# Patient Record
Sex: FEMALE | Race: WHITE | Employment: UNEMPLOYED | URBAN - METROPOLITAN AREA
[De-identification: names, ages, dates, MRNs, and addresses within clinical notes are randomized per-mention and may not be internally consistent; named-entity substitution may affect disease eponyms.]

---

## 2018-04-24 ENCOUNTER — HOSPITAL ENCOUNTER (EMERGENCY)
Age: 48
Discharge: HOME | End: 2018-04-24
Attending: STUDENT IN AN ORGANIZED HEALTH CARE EDUCATION/TRAINING PROGRAM

## 2018-04-24 VITALS
DIASTOLIC BLOOD PRESSURE: 71 MMHG | OXYGEN SATURATION: 98 % | RESPIRATION RATE: 18 BRPM | TEMPERATURE: 98 F | BODY MASS INDEX: 40.92 KG/M2 | WEIGHT: 270 LBS | HEIGHT: 68 IN | HEART RATE: 72 BPM | SYSTOLIC BLOOD PRESSURE: 124 MMHG

## 2018-04-24 DIAGNOSIS — N93.8 DYSFUNCTIONAL UTERINE BLEEDING: Primary | ICD-10-CM

## 2018-04-24 DIAGNOSIS — N93.8 DUB (DYSFUNCTIONAL UTERINE BLEEDING): ICD-10-CM

## 2018-04-24 LAB
ALBUMIN SERPL-MCNC: 3.7 G/DL (ref 3.9–4.9)
ALP BLD-CCNC: 73 U/L (ref 40–130)
ALT SERPL-CCNC: 11 U/L (ref 0–33)
ANION GAP SERPL CALCULATED.3IONS-SCNC: 12 MEQ/L (ref 7–13)
APTT: 24 SEC (ref 21.6–35.4)
AST SERPL-CCNC: 12 U/L (ref 0–35)
BACTERIA: ABNORMAL /HPF
BASOPHILS ABSOLUTE: 0.1 K/UL (ref 0–0.2)
BASOPHILS RELATIVE PERCENT: 1.2 %
BILIRUB SERPL-MCNC: 0.4 MG/DL (ref 0–1.2)
BILIRUBIN URINE: NEGATIVE
BLOOD, URINE: ABNORMAL
BUN BLDV-MCNC: 6 MG/DL (ref 6–20)
CALCIUM SERPL-MCNC: 8.2 MG/DL (ref 8.6–10.2)
CHLORIDE BLD-SCNC: 104 MEQ/L (ref 98–107)
CHP ED QC CHECK: NORMAL
CLARITY: CLEAR
CO2: 24 MEQ/L (ref 22–29)
COLOR: YELLOW
CREAT SERPL-MCNC: 0.39 MG/DL (ref 0.5–0.9)
EOSINOPHILS ABSOLUTE: 0.1 K/UL (ref 0–0.7)
EOSINOPHILS RELATIVE PERCENT: 1.8 %
GFR AFRICAN AMERICAN: >60
GFR NON-AFRICAN AMERICAN: >60
GLOBULIN: 2.7 G/DL (ref 2.3–3.5)
GLUCOSE BLD-MCNC: 84 MG/DL (ref 74–109)
GLUCOSE URINE: NEGATIVE MG/DL
HCT VFR BLD CALC: 38.3 % (ref 37–47)
HEMOGLOBIN: 12.6 G/DL (ref 12–16)
INR BLD: 0.9
KETONES, URINE: NEGATIVE MG/DL
LEUKOCYTE ESTERASE, URINE: ABNORMAL
LYMPHOCYTES ABSOLUTE: 2.2 K/UL (ref 1–4.8)
LYMPHOCYTES RELATIVE PERCENT: 26.8 %
MCH RBC QN AUTO: 29.7 PG (ref 27–31.3)
MCHC RBC AUTO-ENTMCNC: 32.9 % (ref 33–37)
MCV RBC AUTO: 90.3 FL (ref 82–100)
MONOCYTES ABSOLUTE: 0.5 K/UL (ref 0.2–0.8)
MONOCYTES RELATIVE PERCENT: 6 %
NEUTROPHILS ABSOLUTE: 5.2 K/UL (ref 1.4–6.5)
NEUTROPHILS RELATIVE PERCENT: 64.2 %
NITRITE, URINE: NEGATIVE
PDW BLD-RTO: 13.6 % (ref 11.5–14.5)
PH UA: 7 (ref 5–9)
PLATELET # BLD: 173 K/UL (ref 130–400)
POTASSIUM SERPL-SCNC: 4.3 MEQ/L (ref 3.5–5.1)
PREGNANCY TEST URINE, POC: NEGATIVE
PROTEIN UA: NEGATIVE MG/DL
PROTHROMBIN TIME: 9.8 SEC (ref 9.6–12.3)
RBC # BLD: 4.25 M/UL (ref 4.2–5.4)
RBC UA: >100 /HPF (ref 0–2)
SODIUM BLD-SCNC: 140 MEQ/L (ref 132–144)
SPECIFIC GRAVITY UA: 1.01 (ref 1–1.03)
TOTAL PROTEIN: 6.4 G/DL (ref 6.4–8.1)
URINE REFLEX TO CULTURE: YES
UROBILINOGEN, URINE: 0.2 E.U./DL
WBC # BLD: 8.1 K/UL (ref 4.8–10.8)
WBC UA: ABNORMAL /HPF (ref 0–5)

## 2018-04-24 PROCEDURE — 36415 COLL VENOUS BLD VENIPUNCTURE: CPT

## 2018-04-24 PROCEDURE — 85610 PROTHROMBIN TIME: CPT

## 2018-04-24 PROCEDURE — 85730 THROMBOPLASTIN TIME PARTIAL: CPT

## 2018-04-24 PROCEDURE — 80053 COMPREHEN METABOLIC PANEL: CPT

## 2018-04-24 PROCEDURE — 99284 EMERGENCY DEPT VISIT MOD MDM: CPT

## 2018-04-24 PROCEDURE — 87086 URINE CULTURE/COLONY COUNT: CPT

## 2018-04-24 PROCEDURE — 85025 COMPLETE CBC W/AUTO DIFF WBC: CPT

## 2018-04-24 PROCEDURE — 6370000000 HC RX 637 (ALT 250 FOR IP): Performed by: STUDENT IN AN ORGANIZED HEALTH CARE EDUCATION/TRAINING PROGRAM

## 2018-04-24 PROCEDURE — 81001 URINALYSIS AUTO W/SCOPE: CPT

## 2018-04-24 RX ORDER — TRAMADOL HYDROCHLORIDE 50 MG/1
50 TABLET ORAL EVERY 8 HOURS PRN
Qty: 6 TABLET | Refills: 0 | Status: SHIPPED | OUTPATIENT
Start: 2018-04-24 | End: 2018-04-26

## 2018-04-24 RX ORDER — DICYCLOMINE HYDROCHLORIDE 10 MG/1
10 CAPSULE ORAL EVERY 6 HOURS PRN
Qty: 20 CAPSULE | Refills: 0 | Status: SHIPPED | OUTPATIENT
Start: 2018-04-24 | End: 2020-01-19

## 2018-04-24 RX ORDER — NAPROXEN 500 MG/1
500 TABLET ORAL ONCE
Status: DISCONTINUED | OUTPATIENT
Start: 2018-04-24 | End: 2018-04-24 | Stop reason: HOSPADM

## 2018-04-24 RX ORDER — TRAMADOL HYDROCHLORIDE 50 MG/1
50 TABLET ORAL ONCE
Status: COMPLETED | OUTPATIENT
Start: 2018-04-24 | End: 2018-04-24

## 2018-04-24 RX ADMIN — ESTROGENS, CONJUGATED 1.25 MG: 0.62 TABLET, FILM COATED ORAL at 12:55

## 2018-04-24 RX ADMIN — TRAMADOL HYDROCHLORIDE 50 MG: 50 TABLET, FILM COATED ORAL at 13:52

## 2018-04-24 ASSESSMENT — ENCOUNTER SYMPTOMS
SHORTNESS OF BREATH: 0
CHEST TIGHTNESS: 0
COUGH: 0
ABDOMINAL PAIN: 0
SINUS PRESSURE: 0
DIARRHEA: 0
TROUBLE SWALLOWING: 0
BACK PAIN: 0
VOMITING: 0

## 2018-04-24 ASSESSMENT — PAIN SCALES - GENERAL
PAINLEVEL_OUTOF10: 8
PAINLEVEL_OUTOF10: 8

## 2018-04-24 ASSESSMENT — PAIN DESCRIPTION - PAIN TYPE: TYPE: CHRONIC PAIN

## 2018-04-24 ASSESSMENT — PAIN DESCRIPTION - LOCATION: LOCATION: BACK

## 2018-04-26 LAB — URINE CULTURE, ROUTINE: NORMAL

## 2020-01-19 ENCOUNTER — HOSPITAL ENCOUNTER (EMERGENCY)
Age: 50
Discharge: HOME | End: 2020-01-20
Attending: EMERGENCY MEDICINE
Payer: COMMERCIAL

## 2020-01-19 ENCOUNTER — APPOINTMENT (OUTPATIENT)
Dept: GENERAL RADIOLOGY | Age: 50
End: 2020-01-19
Payer: COMMERCIAL

## 2020-01-19 VITALS
SYSTOLIC BLOOD PRESSURE: 108 MMHG | DIASTOLIC BLOOD PRESSURE: 73 MMHG | TEMPERATURE: 97.8 F | BODY MASS INDEX: 41.47 KG/M2 | RESPIRATION RATE: 19 BRPM | WEIGHT: 280 LBS | OXYGEN SATURATION: 96 % | HEART RATE: 74 BPM | HEIGHT: 69 IN

## 2020-01-19 LAB
ANION GAP SERPL CALCULATED.3IONS-SCNC: 7 MEQ/L (ref 9–15)
BASOPHILS ABSOLUTE: 0.1 K/UL (ref 0–0.2)
BASOPHILS RELATIVE PERCENT: 1 %
BUN BLDV-MCNC: 10 MG/DL (ref 6–20)
CALCIUM SERPL-MCNC: 8.9 MG/DL (ref 8.5–9.9)
CHLORIDE BLD-SCNC: 103 MEQ/L (ref 95–107)
CO2: 26 MEQ/L (ref 20–31)
CREAT SERPL-MCNC: 0.55 MG/DL (ref 0.5–0.9)
EKG ATRIAL RATE: 80 BPM
EKG P AXIS: 43 DEGREES
EKG P-R INTERVAL: 176 MS
EKG Q-T INTERVAL: 380 MS
EKG QRS DURATION: 90 MS
EKG QTC CALCULATION (BAZETT): 438 MS
EKG R AXIS: 34 DEGREES
EKG T AXIS: 24 DEGREES
EKG VENTRICULAR RATE: 80 BPM
EOSINOPHILS ABSOLUTE: 0.2 K/UL (ref 0–0.7)
EOSINOPHILS RELATIVE PERCENT: 3.1 %
GFR AFRICAN AMERICAN: >60
GFR NON-AFRICAN AMERICAN: >60
GLUCOSE BLD-MCNC: 107 MG/DL (ref 70–99)
HCT VFR BLD CALC: 38 % (ref 37–47)
HEMOGLOBIN: 12.5 G/DL (ref 12–16)
LYMPHOCYTES ABSOLUTE: 2.6 K/UL (ref 1–4.8)
LYMPHOCYTES RELATIVE PERCENT: 31.9 %
MCH RBC QN AUTO: 29.6 PG (ref 27–31.3)
MCHC RBC AUTO-ENTMCNC: 33.1 % (ref 33–37)
MCV RBC AUTO: 89.6 FL (ref 82–100)
MONOCYTES ABSOLUTE: 0.6 K/UL (ref 0.2–0.8)
MONOCYTES RELATIVE PERCENT: 7.2 %
NEUTROPHILS ABSOLUTE: 4.6 K/UL (ref 1.4–6.5)
NEUTROPHILS RELATIVE PERCENT: 56.8 %
PDW BLD-RTO: 14 % (ref 11.5–14.5)
PLATELET # BLD: 213 K/UL (ref 130–400)
POTASSIUM SERPL-SCNC: 3.5 MEQ/L (ref 3.4–4.9)
RBC # BLD: 4.24 M/UL (ref 4.2–5.4)
SODIUM BLD-SCNC: 136 MEQ/L (ref 135–144)
TROPONIN: <0.01 NG/ML (ref 0–0.01)
TROPONIN: <0.01 NG/ML (ref 0–0.01)
WBC # BLD: 8 K/UL (ref 4.8–10.8)

## 2020-01-19 PROCEDURE — 71046 X-RAY EXAM CHEST 2 VIEWS: CPT

## 2020-01-19 PROCEDURE — 6370000000 HC RX 637 (ALT 250 FOR IP): Performed by: EMERGENCY MEDICINE

## 2020-01-19 PROCEDURE — 99285 EMERGENCY DEPT VISIT HI MDM: CPT

## 2020-01-19 PROCEDURE — 80048 BASIC METABOLIC PNL TOTAL CA: CPT

## 2020-01-19 PROCEDURE — 36415 COLL VENOUS BLD VENIPUNCTURE: CPT

## 2020-01-19 PROCEDURE — 84484 ASSAY OF TROPONIN QUANT: CPT

## 2020-01-19 PROCEDURE — 93005 ELECTROCARDIOGRAM TRACING: CPT | Performed by: EMERGENCY MEDICINE

## 2020-01-19 PROCEDURE — 85025 COMPLETE CBC W/AUTO DIFF WBC: CPT

## 2020-01-19 RX ORDER — ASPIRIN 81 MG/1
324 TABLET, CHEWABLE ORAL ONCE
Status: COMPLETED | OUTPATIENT
Start: 2020-01-19 | End: 2020-01-19

## 2020-01-19 RX ADMIN — ASPIRIN 81 MG 324 MG: 81 TABLET ORAL at 21:43

## 2020-01-19 ASSESSMENT — PAIN SCALES - GENERAL
PAINLEVEL_OUTOF10: 9
PAINLEVEL_OUTOF10: 8

## 2020-01-19 ASSESSMENT — PAIN DESCRIPTION - ORIENTATION: ORIENTATION: RIGHT

## 2020-01-19 ASSESSMENT — PAIN DESCRIPTION - LOCATION
LOCATION: CHEST
LOCATION: CHEST;ARM

## 2020-01-19 ASSESSMENT — HEART SCORE: ECG: 0

## 2020-01-19 ASSESSMENT — PAIN DESCRIPTION - DESCRIPTORS: DESCRIPTORS: BURNING

## 2020-01-20 PROCEDURE — 93010 ELECTROCARDIOGRAM REPORT: CPT | Performed by: INTERNAL MEDICINE

## 2020-01-20 NOTE — ED NOTES
Taking care of pt at this time. Pt stable, a&ox4, skin w/d/pink, pulses palp, msp's intact, lungs diminished, clear, sr on monitor. 0 cough, 0 distress, 0 sob, 0 edema, 0 N&v, 0 distress. Pt states when falling asleep, sob wakes her up, dr. Ramiro Cardenas updated. 0 new orders, at this time.      Laurie Patel, HOMAR  01/19/20 [address], RN  01/19/20 4731

## 2020-01-20 NOTE — ED PROVIDER NOTES
[address] [facility] ED  EMERGENCY DEPARTMENT ENCOUNTER      Pt Name: Azucena Rothman  MRN: [id number]  [place]fjulio césar 1970  Date of evaluation: 2020  Provider: Richard Schultz MD    CHIEF COMPLAINT       Chief Complaint   Patient presents with    Chest Pain     onsset 0.5 hrs ago midsternal that radiates down right arm assoc with nausea. HISTORY OF PRESENT ILLNESS   (Location/Symptom, Timing/Onset, Context/Setting, Quality, Duration, Modifying Factors, Severity)  Note limiting factors. 68-year-old female presenting with right-sided chest pain that radiates down the arm. Symptoms started about 30 minutes prior to arrival.  Nothing makes her symptoms better or worse. They are not associated with exertion. No leg swelling. No shortness of breath, nausea or vomiting. Has not taken anything for pain. No heart history or medical problems noted. Symptoms have been constant since onset. Nursing Notes were reviewed. REVIEW OF SYSTEMS    (2-9 systems for level 4, 10 or more for level 5)     Review of Systems   Cardiovascular: Positive for chest pain. All other systems reviewed and are negative. Except as noted above the remainder of the review of systems was reviewed and negative. PAST MEDICAL HISTORY     Past Medical History:   Diagnosis Date    Fibromyalgia          SURGICAL HISTORY       Past Surgical History:   Procedure Laterality Date     SECTION      x2    TUBAL LIGATION           CURRENT MEDICATIONS       Previous Medications    No medications on file       ALLERGIES     Toradol [ketorolac tromethamine]    FAMILY HISTORY     History reviewed. No pertinent family history.        SOCIAL HISTORY       Social History     Socioeconomic History    Marital status:      Spouse name: None    Number of children: None    Years of education: None    Highest education level: None   Occupational History    None   Social Needs    Financial resource strain: None    Food insecurity:     Worry: None     Inability: None    Transportation needs:     Medical: None     Non-medical: None   Tobacco Use    Smoking status: Never Smoker    Smokeless tobacco: Never Used   Substance and Sexual Activity    Alcohol use: Yes     Comment: social    Drug use: No    Sexual activity: None   Lifestyle    Physical activity:     Days per week: None     Minutes per session: None    Stress: None   Relationships    Social connections:     Talks on phone: None     Gets together: None     Attends [employer]ist service: None     Active member of club or organization: None     Attends meetings of clubs or organizations: None     Relationship status: None    Intimate partner violence:     Fear of current or ex partner: None     Emotionally abused: None     Physically abused: None     Forced sexual activity: None   Other Topics Concern    None   Social History Narrative    None       SCREENINGS      Heart Score for chest pain patients  History: Slightly Suspicious  ECG: Normal  Patient Age: > 39 and < 65 years  *Risk factors for Atherosclerotic disease: Positive family History  Risk Factors: 1 or 2 risk factors  Troponin: < 1X normal limit  Heart Score Total: 2        PHYSICAL EXAM    (up to 7 for level 4, 8 or more for level 5)     ED Triage Vitals [01/19/20 2107]   BP Temp Temp Source Pulse Resp SpO2 Height Weight   117/67 97.8 °F (36.6 °C) Oral 77 19 99 % 5' 9\" (1.753 m) 280 lb (127 kg)       Physical Exam  Vitals signs and nursing note reviewed. Constitutional:       General: She is not in acute distress. Appearance: Normal appearance. She is well-developed. She is not ill-appearing. HENT:      Head: Normocephalic and atraumatic. Right Ear: Tympanic membrane normal.      Left Ear: Tympanic membrane normal.      Mouth/Throat:      Mouth: Mucous membranes are moist.      Pharynx: Oropharynx is clear. Eyes:      Extraocular Movements: Extraocular movements intact. Conjunctiva/sclera: Conjunctivae normal.      Pupils: Pupils are equal, round, and reactive to light. Neck:      Musculoskeletal: Normal range of motion and neck supple. Cardiovascular:      Rate and Rhythm: Normal rate and regular rhythm. Comments: Reproducible chest wall pain  Pulmonary:      Effort: Pulmonary effort is normal.      Breath sounds: Normal breath sounds. Abdominal:      General: Bowel sounds are normal.      Palpations: Abdomen is soft. Musculoskeletal: Normal range of motion. General: No tenderness or deformity. Skin:     General: Skin is warm and dry. Neurological:      General: No focal deficit present. Mental Status: She is alert and oriented to person, place, and time. Cranial Nerves: No cranial nerve deficit. Psychiatric:         Behavior: Behavior normal.         Thought Content: Thought content normal.         DIAGNOSTIC RESULTS     EKG: All EKG's are interpreted by the Emergency Department Physician who either signs or Co-signs this chart in the absence of a cardiologist.    NSR, rate 80, normal intervals, no ST elevations/ depression    RADIOLOGY:   Non-plain film images such as CT, Ultrasound and MRI are read by the radiologist. Plain radiographic images are visualized and preliminarily interpreted by the emergency physician with the below findings:    CXR: no acute findings    Interpretation per the Radiologist below, if available at the time of this note:    XR CHEST STANDARD (2 VW)    (Results Pending)       LABS:  Labs Reviewed   BASIC METABOLIC PANEL - Abnormal; Notable for the following components:       Result Value    Anion Gap 7 (*)     Glucose 107 (*)     All other components within normal limits   CBC WITH AUTO DIFFERENTIAL   TROPONIN   TROPONIN       All other labs were within normal range or not returned as of this dictation.     EMERGENCY DEPARTMENT COURSE and DIFFERENTIAL DIAGNOSIS/MDM:   Vitals:    Vitals:    01/19/20 2107 01/19/20 2218 01/19/20 2332   BP: 117/67 128/78 108/73   Pulse: 77 80 74   Resp: 19 17 19   Temp: 97.8 °F (36.6 °C)     TempSrc: Oral     SpO2: 99% 98% 96%   Weight: 280 lb (127 kg)     Height: 5' 9\" (1.753 m)         MDM  Number of Diagnoses or Management Options  Chest pain, unspecified type:   Diagnosis management comments: Evaluation and workup for chest pain today was unremarkable. HEART score 2. With low risk and negative troponin x2 I feel patient is appropriate for outpatient workup of pain. Onset of symptoms noted to be 30 min PTA, patient rules out for MI at this point. Do not suspect PE based on negative PERC. CXR not suggestive of PNX and history and physical, along with vital signs, do not suggest dissection, tamponade or any life threatening process. Patient will be discharged home. Follow up with doctor in 2-3 days. Patient informed that they should return immediately to ED for new or worsening chest pain or SOB, or any other concerning symptom. Patient well appearing and hemodynamically stable on discharge. Leaves department in good condition. Agreeable with plan of care. Patient Progress  Patient progress: stable      Procedures    CRITICAL CARE TIME   Total Critical Care time was 0 minutes, excluding separately reportable procedures. There was a high probability of clinically significant/life threatening deterioration in the patient's condition which required my urgent intervention. FINAL IMPRESSION      1.  Chest pain, unspecified type          DISPOSITION/PLAN   DISPOSITION        (Please note that portions of this note were completed with a voice recognition program.  Efforts were made to edit the dictations but occasionally words are mis-transcribed.)    Trent Mcardle, MD (electronically signed)  Attending Emergency Physician        Trent Mcardle, MD  01/19/20 5565

## 2020-01-20 NOTE — ED NOTES
Pt stable, a&ox4, skin w/d/pink, 0 pain, 0 sob, sr on monitor. Per dr. Brittany Celmente ok to d/c pt home.      Roena Runner, RN  01/20/20 5676

## 2020-01-22 ENCOUNTER — HOSPITAL ENCOUNTER (EMERGENCY)
Age: 50
Discharge: HOME | End: 2020-01-22
Payer: COMMERCIAL

## 2020-01-22 ENCOUNTER — APPOINTMENT (OUTPATIENT)
Dept: CT IMAGING | Age: 50
End: 2020-01-22
Payer: COMMERCIAL

## 2020-01-22 VITALS
HEART RATE: 74 BPM | BODY MASS INDEX: 39.99 KG/M2 | WEIGHT: 270 LBS | OXYGEN SATURATION: 98 % | RESPIRATION RATE: 17 BRPM | HEIGHT: 69 IN | SYSTOLIC BLOOD PRESSURE: 110 MMHG | DIASTOLIC BLOOD PRESSURE: 74 MMHG | TEMPERATURE: 98.2 F

## 2020-01-22 LAB
ALBUMIN SERPL-MCNC: 3.9 G/DL (ref 3.5–4.6)
ALP BLD-CCNC: 70 U/L (ref 40–130)
ALT SERPL-CCNC: 15 U/L (ref 0–33)
ANION GAP SERPL CALCULATED.3IONS-SCNC: 9 MEQ/L (ref 9–15)
AST SERPL-CCNC: 13 U/L (ref 0–35)
BASOPHILS ABSOLUTE: 0 K/UL (ref 0–0.2)
BASOPHILS RELATIVE PERCENT: 0.6 %
BILIRUB SERPL-MCNC: <0.2 MG/DL (ref 0.2–0.7)
BUN BLDV-MCNC: 10 MG/DL (ref 6–20)
CALCIUM SERPL-MCNC: 9.2 MG/DL (ref 8.5–9.9)
CHLORIDE BLD-SCNC: 104 MEQ/L (ref 95–107)
CO2: 23 MEQ/L (ref 20–31)
CREAT SERPL-MCNC: 0.56 MG/DL (ref 0.5–0.9)
EKG ATRIAL RATE: 79 BPM
EKG P AXIS: 34 DEGREES
EKG P-R INTERVAL: 170 MS
EKG Q-T INTERVAL: 350 MS
EKG QRS DURATION: 94 MS
EKG QTC CALCULATION (BAZETT): 401 MS
EKG R AXIS: 20 DEGREES
EKG T AXIS: 3 DEGREES
EKG VENTRICULAR RATE: 79 BPM
EOSINOPHILS ABSOLUTE: 0.2 K/UL (ref 0–0.7)
EOSINOPHILS RELATIVE PERCENT: 2.5 %
GFR AFRICAN AMERICAN: >60
GFR NON-AFRICAN AMERICAN: >60
GLOBULIN: 3.4 G/DL (ref 2.3–3.5)
GLUCOSE BLD-MCNC: 95 MG/DL (ref 70–99)
HCT VFR BLD CALC: 40.2 % (ref 37–47)
HEMOGLOBIN: 13.5 G/DL (ref 12–16)
LIPASE: 26 U/L (ref 12–95)
LYMPHOCYTES ABSOLUTE: 1.3 K/UL (ref 1–4.8)
LYMPHOCYTES RELATIVE PERCENT: 15.7 %
MAGNESIUM: 1.8 MG/DL (ref 1.7–2.4)
MCH RBC QN AUTO: 29.6 PG (ref 27–31.3)
MCHC RBC AUTO-ENTMCNC: 33.4 % (ref 33–37)
MCV RBC AUTO: 88.7 FL (ref 82–100)
MONOCYTES ABSOLUTE: 0.5 K/UL (ref 0.2–0.8)
MONOCYTES RELATIVE PERCENT: 6 %
NEUTROPHILS ABSOLUTE: 6.4 K/UL (ref 1.4–6.5)
NEUTROPHILS RELATIVE PERCENT: 75.2 %
PDW BLD-RTO: 14.3 % (ref 11.5–14.5)
PLATELET # BLD: 194 K/UL (ref 130–400)
POC CREATININE WHOLE BLOOD: 0.6
POTASSIUM SERPL-SCNC: 3.7 MEQ/L (ref 3.4–4.9)
RBC # BLD: 4.54 M/UL (ref 4.2–5.4)
SODIUM BLD-SCNC: 136 MEQ/L (ref 135–144)
TOTAL PROTEIN: 7.3 G/DL (ref 6.3–8)
TROPONIN: <0.01 NG/ML (ref 0–0.01)
WBC # BLD: 8.5 K/UL (ref 4.8–10.8)

## 2020-01-22 PROCEDURE — 93005 ELECTROCARDIOGRAM TRACING: CPT | Performed by: STUDENT IN AN ORGANIZED HEALTH CARE EDUCATION/TRAINING PROGRAM

## 2020-01-22 PROCEDURE — 85025 COMPLETE CBC W/AUTO DIFF WBC: CPT

## 2020-01-22 PROCEDURE — 99285 EMERGENCY DEPT VISIT HI MDM: CPT

## 2020-01-22 PROCEDURE — 36415 COLL VENOUS BLD VENIPUNCTURE: CPT

## 2020-01-22 PROCEDURE — 84484 ASSAY OF TROPONIN QUANT: CPT

## 2020-01-22 PROCEDURE — 74177 CT ABD & PELVIS W/CONTRAST: CPT

## 2020-01-22 PROCEDURE — 6370000000 HC RX 637 (ALT 250 FOR IP): Performed by: PERSONAL EMERGENCY RESPONSE ATTENDANT

## 2020-01-22 PROCEDURE — 83690 ASSAY OF LIPASE: CPT

## 2020-01-22 PROCEDURE — 96374 THER/PROPH/DIAG INJ IV PUSH: CPT

## 2020-01-22 PROCEDURE — 80053 COMPREHEN METABOLIC PANEL: CPT

## 2020-01-22 PROCEDURE — 83735 ASSAY OF MAGNESIUM: CPT

## 2020-01-22 PROCEDURE — 6360000002 HC RX W HCPCS: Performed by: PERSONAL EMERGENCY RESPONSE ATTENDANT

## 2020-01-22 PROCEDURE — 6360000004 HC RX CONTRAST MEDICATION: Performed by: PERSONAL EMERGENCY RESPONSE ATTENDANT

## 2020-01-22 RX ORDER — DICYCLOMINE HYDROCHLORIDE 10 MG/1
10 CAPSULE ORAL ONCE
Status: COMPLETED | OUTPATIENT
Start: 2020-01-22 | End: 2020-01-22

## 2020-01-22 RX ORDER — KETOROLAC TROMETHAMINE 30 MG/ML
30 INJECTION, SOLUTION INTRAMUSCULAR; INTRAVENOUS ONCE
Status: COMPLETED | OUTPATIENT
Start: 2020-01-22 | End: 2020-01-22

## 2020-01-22 RX ORDER — DICYCLOMINE HYDROCHLORIDE 10 MG/1
10 CAPSULE ORAL EVERY 6 HOURS PRN
Qty: 20 CAPSULE | Refills: 0 | Status: SHIPPED | OUTPATIENT
Start: 2020-01-22

## 2020-01-22 RX ADMIN — IOPAMIDOL 100 ML: 755 INJECTION, SOLUTION INTRAVENOUS at 19:06

## 2020-01-22 RX ADMIN — KETOROLAC TROMETHAMINE 30 MG: 30 INJECTION, SOLUTION INTRAMUSCULAR at 18:49

## 2020-01-22 RX ADMIN — DICYCLOMINE HYDROCHLORIDE 10 MG: 10 CAPSULE ORAL at 19:55

## 2020-01-22 ASSESSMENT — PAIN DESCRIPTION - FREQUENCY
FREQUENCY: CONTINUOUS
FREQUENCY: CONTINUOUS

## 2020-01-22 ASSESSMENT — PAIN SCALES - GENERAL
PAINLEVEL_OUTOF10: 10
PAINLEVEL_OUTOF10: 10
PAINLEVEL_OUTOF10: 8

## 2020-01-22 ASSESSMENT — ENCOUNTER SYMPTOMS
NAUSEA: 0
RHINORRHEA: 0
COUGH: 0
VOMITING: 0
BLOOD IN STOOL: 0
ABDOMINAL PAIN: 1
SORE THROAT: 0
DIARRHEA: 1
SHORTNESS OF BREATH: 0
COLOR CHANGE: 0

## 2020-01-22 ASSESSMENT — PAIN DESCRIPTION - PAIN TYPE
TYPE: ACUTE PAIN
TYPE: ACUTE PAIN

## 2020-01-22 ASSESSMENT — PAIN DESCRIPTION - DESCRIPTORS: DESCRIPTORS: PRESSURE

## 2020-01-22 ASSESSMENT — PAIN DESCRIPTION - LOCATION: LOCATION: CHEST

## 2020-01-22 NOTE — ED PROVIDER NOTES
HISTORY     Past Medical History:   Diagnosis Date    Fibromyalgia          SURGICAL HISTORY       Past Surgical History:   Procedure Laterality Date     SECTION      x2    TUBAL LIGATION           CURRENT MEDICATIONS       Previous Medications    No medications on file       ALLERGIES     Toradol [ketorolac tromethamine]    FAMILY HISTORY     History reviewed. No pertinent family history. SOCIAL HISTORY       Social History     Socioeconomic History    Marital status:      Spouse name: None    Number of children: None    Years of education: None    Highest education level: None   Occupational History    None   Social Needs    Financial resource strain: None    Food insecurity:     Worry: None     Inability: None    Transportation needs:     Medical: None     Non-medical: None   Tobacco Use    Smoking status: Never Smoker    Smokeless tobacco: Never Used   Substance and Sexual Activity    Alcohol use: Yes     Comment: social    Drug use: No    Sexual activity: None   Lifestyle    Physical activity:     Days per week: None     Minutes per session: None    Stress: None   Relationships    Social connections:     Talks on phone: None     Gets together: None     Attends Sabianist service: None     Active member of club or organization: None     Attends meetings of clubs or organizations: None     Relationship status: None    Intimate partner violence:     Fear of current or ex partner: None     Emotionally abused: None     Physically abused: None     Forced sexual activity: None   Other Topics Concern    None   Social History Narrative    None         PHYSICAL EXAM         ED Triage Vitals [20 1801]   BP Temp Temp Source Pulse Resp SpO2 Height Weight   139/87 98.2 °F (36.8 °C) Oral 83 16 98 % 5' 9\" (1.753 m) 270 lb (122.5 kg)       Physical Exam  Constitutional:       Appearance: She is well-developed. HENT:      Head: Normocephalic and atraumatic.    Eyes: URINE - Normal   COMPREHENSIVE METABOLIC PANEL   CBC WITH AUTO DIFFERENTIAL   TROPONIN   LIPASE   MAGNESIUM       All other labs were within normal range or not returned as of this dictation. EMERGENCY DEPARTMENT COURSE and DIFFERENTIAL DIAGNOSIS/MDM:   Vitals:    Vitals:    01/22/20 1801 01/22/20 1922   BP: 139/87 108/71   Pulse: 83 78   Resp: 16 17   Temp: 98.2 °F (36.8 °C) 98.2 °F (36.8 °C)   TempSrc: Oral Oral   SpO2: 98% 96%   Weight: 270 lb (122.5 kg)    Height: 5' 9\" (1.753 m)          MDM    CT the abdomen shows fatty liver and incidental kidney cyst.  Lab work is unremarkable. Patient is to Toradol for her pain and states is unchanged. However patient appears nontoxic in no apparent distress, minimal tenderness to palpation in right upper quadrant & epigastric area. Patient was informed pain could be gas, viral illness, possible gallbladder pain. However blood work and CAT scan showed no gallbladder abnormalities, stones, sludge. CAT scan does show ball of stool in RUQ. She was sent home with Bentyl. When I informed her she can take Motrin at home every 6-8 hours for her pain she rolled her eyes. He is aware to stick to brat diet at home standard anticipatory guidance given to patient upon discharge. Have given them a specific time frame in which to follow-up and who to follow-up with. I have also advised them that they should return to the emergency department if they get worse, or not getting better or develop any new or concerning symptoms. Patient demonstrates understanding. CRITICAL CARE TIME   Total Critical Caretime was 0 minutes, excluding separately reportable procedures. There was a high probability of clinically significant/life threatening deterioration in the patient's condition which required my urgent intervention. Procedures    FINAL IMPRESSION      1. Abdominal pain, right upper quadrant    2.  Diarrhea, unspecified type          DISPOSITION/PLAN   DISPOSITION

## 2020-01-22 NOTE — ED TRIAGE NOTES
Pt c/o chest pain for the past 4 days , Pt states she was seen here on 1/13 for the same symptom with no improvement, Pt is A&OX3, calm, ambulatory, afebrile, breathes are equal and unlabored, Pt states she has not followed up after her ER visit,

## 2020-01-24 PROCEDURE — 93010 ELECTROCARDIOGRAM REPORT: CPT | Performed by: INTERNAL MEDICINE

## 2020-10-19 ENCOUNTER — HOSPITAL ENCOUNTER (EMERGENCY)
Age: 50
Discharge: HOME | End: 2020-10-19
Attending: EMERGENCY MEDICINE
Payer: COMMERCIAL

## 2020-10-19 VITALS
DIASTOLIC BLOOD PRESSURE: 62 MMHG | WEIGHT: 280 LBS | OXYGEN SATURATION: 99 % | SYSTOLIC BLOOD PRESSURE: 128 MMHG | RESPIRATION RATE: 18 BRPM | HEIGHT: 69 IN | BODY MASS INDEX: 41.47 KG/M2 | TEMPERATURE: 98.2 F | HEART RATE: 68 BPM

## 2020-10-19 PROCEDURE — 96372 THER/PROPH/DIAG INJ SC/IM: CPT

## 2020-10-19 PROCEDURE — 6370000000 HC RX 637 (ALT 250 FOR IP): Performed by: EMERGENCY MEDICINE

## 2020-10-19 PROCEDURE — 6360000002 HC RX W HCPCS: Performed by: EMERGENCY MEDICINE

## 2020-10-19 PROCEDURE — 99284 EMERGENCY DEPT VISIT MOD MDM: CPT

## 2020-10-19 RX ORDER — KETOROLAC TROMETHAMINE 30 MG/ML
60 INJECTION, SOLUTION INTRAMUSCULAR; INTRAVENOUS ONCE
Status: COMPLETED | OUTPATIENT
Start: 2020-10-19 | End: 2020-10-19

## 2020-10-19 RX ORDER — FENTANYL CITRATE 50 UG/ML
100 INJECTION, SOLUTION INTRAMUSCULAR; INTRAVENOUS ONCE
Status: DISCONTINUED | OUTPATIENT
Start: 2020-10-19 | End: 2020-10-19

## 2020-10-19 RX ORDER — FENTANYL CITRATE 50 UG/ML
100 INJECTION, SOLUTION INTRAMUSCULAR; INTRAVENOUS ONCE
Status: COMPLETED | OUTPATIENT
Start: 2020-10-19 | End: 2020-10-19

## 2020-10-19 RX ORDER — ONDANSETRON 4 MG/1
4 TABLET, ORALLY DISINTEGRATING ORAL ONCE
Status: COMPLETED | OUTPATIENT
Start: 2020-10-19 | End: 2020-10-19

## 2020-10-19 RX ADMIN — ONDANSETRON 4 MG: 4 TABLET, ORALLY DISINTEGRATING ORAL at 12:43

## 2020-10-19 RX ADMIN — KETOROLAC TROMETHAMINE 60 MG: 30 INJECTION, SOLUTION INTRAMUSCULAR; INTRAVENOUS at 12:43

## 2020-10-19 RX ADMIN — FENTANYL CITRATE 100 MCG: 50 INJECTION, SOLUTION INTRAMUSCULAR; INTRAVENOUS at 12:43

## 2020-10-19 RX ADMIN — FENTANYL CITRATE 100 MCG: 50 INJECTION INTRAMUSCULAR; INTRAVENOUS at 10:49

## 2020-10-19 ASSESSMENT — PAIN DESCRIPTION - LOCATION
LOCATION: BACK
LOCATION: BACK

## 2020-10-19 ASSESSMENT — ENCOUNTER SYMPTOMS
VOMITING: 0
BACK PAIN: 1
COLOR CHANGE: 0
EYE DISCHARGE: 0
RHINORRHEA: 0
ABDOMINAL DISTENTION: 0
ABDOMINAL PAIN: 0
SHORTNESS OF BREATH: 0
PHOTOPHOBIA: 0
FACIAL SWELLING: 0
WHEEZING: 0

## 2020-10-19 ASSESSMENT — PAIN DESCRIPTION - PROGRESSION
CLINICAL_PROGRESSION: GRADUALLY WORSENING
CLINICAL_PROGRESSION: GRADUALLY WORSENING

## 2020-10-19 ASSESSMENT — PAIN DESCRIPTION - DESCRIPTORS
DESCRIPTORS: ACHING
DESCRIPTORS: ACHING

## 2020-10-19 ASSESSMENT — PAIN DESCRIPTION - ONSET
ONSET: ON-GOING
ONSET: ON-GOING

## 2020-10-19 ASSESSMENT — PAIN DESCRIPTION - ORIENTATION
ORIENTATION: RIGHT
ORIENTATION: RIGHT

## 2020-10-19 ASSESSMENT — PAIN DESCRIPTION - FREQUENCY
FREQUENCY: INTERMITTENT
FREQUENCY: CONTINUOUS

## 2020-10-19 ASSESSMENT — PAIN DESCRIPTION - PAIN TYPE
TYPE: ACUTE PAIN

## 2020-10-19 ASSESSMENT — PAIN SCALES - GENERAL
PAINLEVEL_OUTOF10: 10
PAINLEVEL_OUTOF10: 4
PAINLEVEL_OUTOF10: 7
PAINLEVEL_OUTOF10: 10

## 2020-10-19 NOTE — ED PROVIDER NOTES
[facility] [facility] ED  eMERGENCY dEPARTMENT eNCOUnter      Pt Name: Mariusz Harvey  MRN: [id number]  [place] 1970  Date of evaluation: 10/19/2020  Provider: Carla Romero, [address] [place] [address]       Chief Complaint   Patient presents with    Back Pain     right side; radiating to front chest and left arm         HISTORY OF PRESENT ILLNESS   (Location/Symptom, Timing/Onset,Context/Setting, Quality, Duration, Modifying Factors, Severity)  Note limiting factors. Mariusz Harvey is a 52 y.o. female who presents to the emergency department with a chief compliant of ongoing worsening pain in her back. She points to the back of her neck and high thoracic and describes it as in her spine and then radiating to the right side around to the front with associated pain in her left arm. She denies any numbness, tingling, exercise fatigue, fever, chills, or any other complaints other than worsening of her ongoing pain. She has Percocets at home that she barely uses because she feels like they do not work. She has been offered other modalities such as prednisone which she has declined because they never work. She has been suffering from fibromyalgia for 15 years and feels that this pain is different however she has been undergoing a work-up by her rheumatologist as an outpatient for the same pain and has recently had negative x-rays. She has been asked to get an MRI but was unable to tolerate it with anxiety lytics and was sent to the ER for a fully sedated one. Patient states that she needs surgical sedation and needs to be knocked out for the whole thing otherwise she has no chance of tolerating it even in an open MRI machine. She describes the pain is worse with movement and especially with sneezing or sudden movements. She denies any other complaints at this time. HPI    NursingNotes were reviewed.     REVIEW OF SYSTEMS    (2-9 systems for level 4, 10 or more for level 5)     Review of Systems   Constitutional:  Smokeless tobacco: Never Used   Substance and Sexual Activity    Alcohol use: Yes     Comment: social    Drug use: No    Sexual activity: Not on file   Lifestyle    Physical activity     Days per week: Not on file     Minutes per session: Not on file    Stress: Not on file   Relationships    Social connections     Talks on phone: Not on file     Gets together: Not on file     Attends Religion service: Not on file     Active member of club or organization: Not on file     Attends meetings of clubs or organizations: Not on file     Relationship status: Not on file    Intimate partner violence     Fear of current or ex partner: Not on file     Emotionally abused: Not on file     Physically abused: Not on file     Forced sexual activity: Not on file   Other Topics Concern    Not on file   Social History Narrative    Not on file       SCREENINGS      @RRSN([id number])@      PHYSICAL EXAM    (up to 7 for level 4, 8 or more for level 5)     ED Triage Vitals [10/19/20 1021]   BP Temp Temp Source Pulse Resp SpO2 Height Weight   127/80 98.2 °F (36.8 °C) Oral 81 16 95 % 5' 9\" (1.753 m) 280 lb (127 kg)       Physical Exam  Constitutional:       General: She is in acute distress. Appearance: She is well-developed. She is not ill-appearing or toxic-appearing. HENT:      Head: Normocephalic and atraumatic. Right Ear: Tympanic membrane normal.      Left Ear: Tympanic membrane normal.      Nose: No congestion. Eyes:      Conjunctiva/sclera: Conjunctivae normal.      Pupils: Pupils are equal, round, and reactive to light. Neck:      Musculoskeletal: Neck supple. No neck rigidity or muscular tenderness. Comments: Patient has limited range of motion which is typical for her  Cardiovascular:      Rate and Rhythm: Normal rate. Heart sounds: No murmur. No friction rub. No gallop. Pulmonary:      Effort: Pulmonary effort is normal. No respiratory distress. Breath sounds: No wheezing or rhonchi. Comments: Patient is tender to palpation at her right fourth fifth and sixth ribs starting at her posterior joint at the vertebra and extending around her chest wall. There is no associated rash or skin changes. Chest:      Chest wall: Tenderness present. Abdominal:      General: Bowel sounds are normal. There is no distension. Palpations: Abdomen is soft. Tenderness: There is no abdominal tenderness. Musculoskeletal: Normal range of motion. Right lower leg: No edema. Left lower leg: No edema. Comments: Patient has pain with palpation of her spine at the lower cervical and upper thoracic's. She is neurovascularly intact distally at bilateral upper extremities with full range of motion of her extremities though this does increase her complaints of pain. Skin:     General: Skin is warm and dry. Coloration: Skin is not jaundiced. Findings: No bruising or rash. Neurological:      General: No focal deficit present. Mental Status: She is alert and oriented to person, place, and time. Motor: No weakness. Deep Tendon Reflexes: Reflexes are normal and symmetric. DIAGNOSTIC RESULTS     EKG: All EKG's are interpreted by the Emergency Department Physician who either signs or Co-signsthis chart in the absence of a cardiologist.        RADIOLOGY:   Non-plain filmimages such as CT, Ultrasound and MRI are read by the radiologist. Plain radiographic images are visualized and preliminarily interpreted by the emergency physician with the below findings:    Declines x-rays or CAT scan here. Recent x-rays are reviewed and found to be within normal limits. Interpretation per the Radiologist below, if available at the time ofthis note:    No orders to display         ED BEDSIDE ULTRASOUND:   Performed by ED Physician - none    LABS:  Labs Reviewed - No data to display    All other labs were within normal range or not returned as of this dictation.     EMERGENCY DEPARTMENT COURSE and DIFFERENTIAL DIAGNOSIS/MDM:   Vitals:    Vitals:    10/19/20 1021 10/19/20 1136   BP: 127/80 119/74   Pulse: 81 62   Resp: 16 20   Temp: 98.2 °F (36.8 °C)    TempSrc: Oral    SpO2: 95% 100%   Weight: 280 lb (127 kg)    Height: 5' 9\" (1.753 m)        Patient is medicated for pain and found to be sleeping on reevaluation. On second round of reevaluation she is awake and claiming that she has had no change in her pain level. She is medicated again and Toradol was added after discovering that her allergy is \"nausea\" and adding Zofran. On follow-up again she has had no change in her pain status reported and is just wishing to leave. She has a very negative energy and states that no one has ever been able to help her and she was not surprised that we were unable to help her today. We discussed having the MRI ordered acutely however she needs full surgical sedation that is not able to be ordered from the emergency department setting or manage from the emergency department setting and this would have to be set up as an inpatient or outpatient. She is disgusted with this answer and has no desire to come into the hospital.  Nothing I offer her for home is acceptable because it has never worked for her before and she wishes to be discharged. She has plans to follow-up with rheumatology, results from recent appointments were reviewed with her by myself and she is aware that her inflammatory markers are marginally elevated and her recent x-rays were negative. She plans to schedule her outpatient MRI. We discussed signs and symptoms which should prompt her urgent return to the emergency department in the interim. She is discharged home in stable condition. MDM    CRITICAL CARE TIME   Total Critical Care time was 0 minutes, excluding separately reportableprocedures.   There was a high probability of clinicallysignificant/life threatening deterioration in the patient's condition which required my urgent intervention. CONSULTS:  None    PROCEDURES:  Unless otherwise noted below, none     Procedures    FINAL IMPRESSION      1.  Acute exacerbation of chronic low back pain          DISPOSITION/PLAN   DISPOSITION Decision To Discharge 10/19/2020 01:40:01 PM      PATIENT REFERRED TO:    continue your outpatient workup of this ongoing pain, return to the ER if it becomes intolerable          DISCHARGE MEDICATIONS:  New Prescriptions    No medications on file          (Please note that portions of this note were completed with a voice recognition program.  Efforts were made to edit the dictations but occasionally words are mis-transcribed.)    Yary Tineo DO (electronically signed)  Attending Emergency Physician          Yary Tineo DO  10/19/20 [id number]

## 2020-10-19 NOTE — ED NOTES
Patient stated that pain has gotten somewhat tolerable, but still having pain. Dr. Joseph Thomas made aware. No distress noted.       Elo Thomas RN  10/19/20 1915

## 2020-10-19 NOTE — ED TRIAGE NOTES
Pt here with complaints of right sided upper back pain that radiates into the front of her chest. From there, the pain radiates down her left arm. She reports the back pain started about a month ago. She had seen a physician who ordered an MRI. She was unable to do the MRI due to being claustrophobic. She goes for an MRI under anesthesia on 10/29. She reported worsening pain and was advised to come to ER. She reports \"over 10/10\" pain. Denies taking anything due to nothing working.

## 2021-04-22 ENCOUNTER — APPOINTMENT (OUTPATIENT)
Dept: GENERAL RADIOLOGY | Age: 51
End: 2021-04-22
Payer: COMMERCIAL

## 2021-04-22 ENCOUNTER — HOSPITAL ENCOUNTER (EMERGENCY)
Age: 51
Discharge: HOME | End: 2021-04-22
Attending: STUDENT IN AN ORGANIZED HEALTH CARE EDUCATION/TRAINING PROGRAM
Payer: COMMERCIAL

## 2021-04-22 VITALS
HEART RATE: 78 BPM | OXYGEN SATURATION: 97 % | TEMPERATURE: 98.1 F | DIASTOLIC BLOOD PRESSURE: 82 MMHG | HEIGHT: 69 IN | SYSTOLIC BLOOD PRESSURE: 128 MMHG | WEIGHT: 280 LBS | RESPIRATION RATE: 18 BRPM | BODY MASS INDEX: 41.47 KG/M2

## 2021-04-22 DIAGNOSIS — G54.0 THORACIC OUTLET SYNDROME OF LEFT THORACIC OUTLET: Primary | ICD-10-CM

## 2021-04-22 DIAGNOSIS — M62.838 TRAPEZIUS MUSCLE SPASM: ICD-10-CM

## 2021-04-22 LAB
EKG ATRIAL RATE: 77 BPM
EKG P AXIS: 42 DEGREES
EKG P-R INTERVAL: 168 MS
EKG Q-T INTERVAL: 380 MS
EKG QRS DURATION: 92 MS
EKG QTC CALCULATION (BAZETT): 430 MS
EKG R AXIS: 33 DEGREES
EKG T AXIS: 16 DEGREES
EKG VENTRICULAR RATE: 77 BPM

## 2021-04-22 PROCEDURE — 6370000000 HC RX 637 (ALT 250 FOR IP): Performed by: STUDENT IN AN ORGANIZED HEALTH CARE EDUCATION/TRAINING PROGRAM

## 2021-04-22 PROCEDURE — 93010 ELECTROCARDIOGRAM REPORT: CPT | Performed by: INTERNAL MEDICINE

## 2021-04-22 PROCEDURE — 71046 X-RAY EXAM CHEST 2 VIEWS: CPT

## 2021-04-22 PROCEDURE — 93005 ELECTROCARDIOGRAM TRACING: CPT | Performed by: STUDENT IN AN ORGANIZED HEALTH CARE EDUCATION/TRAINING PROGRAM

## 2021-04-22 PROCEDURE — 99283 EMERGENCY DEPT VISIT LOW MDM: CPT

## 2021-04-22 PROCEDURE — 72050 X-RAY EXAM NECK SPINE 4/5VWS: CPT

## 2021-04-22 RX ORDER — GABAPENTIN 300 MG/1
600 CAPSULE ORAL PRN
COMMUNITY
Start: 2020-12-24

## 2021-04-22 RX ORDER — NAPROXEN 250 MG/1
500 TABLET ORAL ONCE
Status: COMPLETED | OUTPATIENT
Start: 2021-04-22 | End: 2021-04-22

## 2021-04-22 RX ORDER — DIAZEPAM 10 MG/1
10 TABLET ORAL EVERY 6 HOURS PRN
Qty: 12 TABLET | Refills: 0 | Status: SHIPPED | OUTPATIENT
Start: 2021-04-22 | End: 2021-04-25

## 2021-04-22 RX ORDER — OXYCODONE HYDROCHLORIDE AND ACETAMINOPHEN 5; 325 MG/1; MG/1
1 TABLET ORAL DAILY PRN
COMMUNITY
Start: 2021-04-08 | End: 2021-05-08

## 2021-04-22 RX ORDER — ASPIRIN 81 MG
56.6 TABLET,CHEWABLE ORAL 3 TIMES DAILY PRN
Qty: 60 G | Refills: 0 | Status: SHIPPED | OUTPATIENT
Start: 2021-04-22

## 2021-04-22 RX ORDER — NAPROXEN 500 MG/1
500 TABLET ORAL 2 TIMES DAILY
Qty: 20 TABLET | Refills: 0 | Status: SHIPPED | OUTPATIENT
Start: 2021-04-22 | End: 2021-05-02

## 2021-04-22 RX ADMIN — NAPROXEN 500 MG: 250 TABLET ORAL at 11:28

## 2021-04-22 ASSESSMENT — ENCOUNTER SYMPTOMS
TROUBLE SWALLOWING: 0
COUGH: 0
VOMITING: 0
ABDOMINAL PAIN: 0
SHORTNESS OF BREATH: 0
CHEST TIGHTNESS: 0
SINUS PRESSURE: 0
BACK PAIN: 0
DIARRHEA: 0

## 2021-04-22 ASSESSMENT — PAIN DESCRIPTION - ORIENTATION: ORIENTATION: LEFT

## 2021-04-22 NOTE — ED TRIAGE NOTES
Pt comes to er with c/o left arm pain. States pain is 10/10 because she also has fibromyalgia and it  Causes pain to be increased but pt has not  Taken any meds to help with pain today as they will not help. Pain  Is from the shoulder all the way to the fingers. States her fingers feel asleep and her hand is swollen.  No known injury

## 2021-04-22 NOTE — ED PROVIDER NOTES
shortness of breath. Cardiovascular: Negative for chest pain and leg swelling. Gastrointestinal: Negative for abdominal pain, diarrhea and vomiting. Endocrine: Negative for polydipsia and polyphagia. Genitourinary: Negative for dysuria, flank pain and frequency. Musculoskeletal: Positive for neck pain ( spasm today). Negative for back pain and myalgias. Skin: Negative for pallor and rash. Neurological: Positive for numbness ( left thumb and left index finger x 7 days). Negative for syncope, weakness and headaches. Hematological: Does not bruise/bleed easily. All other systems reviewed and are negative. Except as noted above the remainder of the review of systems was reviewed and negative. PAST MEDICAL HISTORY     Past Medical History:   Diagnosis Date    Fibromyalgia          SURGICALHISTORY       Past Surgical History:   Procedure Laterality Date     SECTION      x2    TUBAL LIGATION           CURRENT MEDICATIONS       Discharge Medication List as of 2021 12:03 PM      CONTINUE these medications which have NOT CHANGED    Details   gabapentin (NEURONTIN) 300 MG capsule Take 600 mg by mouth as needed. Historical Med      Liraglutide (VICTOZA) 18 MG/3ML SOPN SC injection Inject into the skin dailyHistorical Med      oxyCODONE-acetaminophen (PERCOCET) 5-325 MG per tablet Take 1 tablet by mouth daily as needed. Historical Med      dicyclomine (BENTYL) 10 MG capsule Take 1 capsule by mouth every 6 hours as needed (cramps), Disp-20 capsule, R-0Print             ALLERGIES     Toradol [ketorolac tromethamine]    FAMILY HISTORY     History reviewed. No pertinent family history.        SOCIAL HISTORY       Social History     Socioeconomic History    Marital status:      Spouse name: None    Number of children: None    Years of education: None    Highest education level: None   Occupational History    None   Social Needs    Financial resource strain: None   [employer] insecurity     Worry: None     Inability: None    Transportation needs     Medical: None     Non-medical: None   Tobacco Use    Smoking status: Never Smoker    Smokeless tobacco: Never Used   Substance and Sexual Activity    Alcohol use: Yes     Comment: social    Drug use: No    Sexual activity: None   Lifestyle    Physical activity     Days per week: None     Minutes per session: None    Stress: None   Relationships    Social connections     Talks on phone: None     Gets together: None     Attends [employer] service: None     Active member of club or organization: None     Attends meetings of clubs or organizations: None     Relationship status: None    Intimate partner violence     Fear of current or ex partner: None     Emotionally abused: None     Physically abused: None     Forced sexual activity: None   Other Topics Concern    None   Social History Narrative    None       SCREENINGS      @FLOW([id number])@      PHYSICAL EXAM    (up to 7 for level 4, 8 or more for level 5)     ED Triage Vitals [04/22/21 1043]   BP Temp Temp Source Pulse Resp SpO2 Height Weight   126/80 97.2 °F (36.2 °C) Oral 80 18 97 % 5' 9\" (1.753 m) 280 lb (127 kg)       Physical Exam  Vitals signs and nursing note reviewed. Exam conducted with a chaperone present. Constitutional:       General: She is awake. She is not in acute distress. Appearance: Normal appearance. She is well-developed and normal weight. She is not ill-appearing, toxic-appearing or diaphoretic. Comments: No photophobia. No phonophobia. HENT:      Head: Normocephalic and atraumatic. No Dougherty's sign. Right Ear: External ear normal.      Left Ear: External ear normal.      Nose: Nose normal. No congestion or rhinorrhea. Mouth/Throat:      Mouth: Mucous membranes are moist.      Pharynx: Oropharynx is clear. No oropharyngeal exudate or posterior oropharyngeal erythema. Eyes:      General: No scleral icterus.         Right eye: No foreign body or discharge. Left eye: No discharge. Extraocular Movements: Extraocular movements intact. Conjunctiva/sclera: Conjunctivae normal.      Left eye: No exudate. Pupils: Pupils are equal, round, and reactive to light. Neck:      Musculoskeletal: Neck supple. Decreased range of motion. Pain with movement and muscular tenderness present. No edema, erythema, neck rigidity, crepitus, injury, torticollis or spinous process tenderness. Thyroid: No thyroid mass. Vascular: Normal carotid pulses. No carotid bruit, hepatojugular reflux or JVD. Trachea: Trachea and phonation normal. No tracheal tenderness, abnormal tracheal secretions or tracheal deviation. Comments: No meningismus. Cardiovascular:      Rate and Rhythm: Normal rate and regular rhythm. No extrasystoles are present. Chest Wall: PMI is not displaced. No thrill. Pulses: Normal pulses. No decreased pulses. Radial pulses are 2+ on the right side and 2+ on the left side. Heart sounds: Normal heart sounds, S1 normal and S2 normal. Heart sounds not distant. No murmur. No systolic murmur. No diastolic murmur. No friction rub. No gallop. No S3 or S4 sounds. Pulmonary:      Effort: Pulmonary effort is normal. No respiratory distress. Breath sounds: Normal breath sounds. No stridor. No wheezing, rhonchi or rales. Chest:      Chest wall: No tenderness. Abdominal:      General: Abdomen is flat. Bowel sounds are normal. There is no distension. Palpations: Abdomen is soft. There is no mass. Tenderness: There is no abdominal tenderness. There is no right CVA tenderness, left CVA tenderness, guarding or rebound. Hernia: No hernia is present. Musculoskeletal:         General: No swelling, tenderness, deformity or signs of injury. Right lower leg: No edema. Left lower leg: No edema.       Comments: Gross muscle strength 5 out of 5 bilateral equal and symmetric upper and lower extremities. Lymphadenopathy:      Head:      Right side of head: No submental adenopathy. Left side of head: No submental adenopathy. Cervical: No cervical adenopathy. Right cervical: No superficial, deep or posterior cervical adenopathy. Left cervical: No superficial, deep or posterior cervical adenopathy. Skin:     General: Skin is warm and dry. Capillary Refill: Capillary refill takes less than 2 seconds. Coloration: Skin is not jaundiced or pale. Findings: No bruising, erythema, lesion or rash. Neurological:      General: No focal deficit present. Mental Status: She is alert and oriented to person, place, and time. Mental status is at baseline. Cranial Nerves: No cranial nerve deficit. Sensory: No sensory deficit. Motor: No weakness. Coordination: Coordination normal.      Deep Tendon Reflexes: Reflexes are normal and symmetric. Psychiatric:         Mood and Affect: Mood normal.         Behavior: Behavior normal. Behavior is cooperative. Thought Content: Thought content normal.         Judgment: Judgment normal.         DIAGNOSTIC RESULTS     EKG: All EKG's are interpreted by the Emergency Department Physician who either signs or Co-signsthis chart in the absence of a cardiologist.    EKG: Sinus rhythm at 77 bpm.  Wavering baseline in lead III. Normal axis. QT interval is 380 ms. Low voltage of the precordial leads likely due to body habitus. There are no PVCs. There is good R wave transition the precordial leads. RADIOLOGY:   Non-plain filmimages such as CT, Ultrasound and MRI are read by the radiologist. Plain radiographic images are visualized and preliminarily interpreted by the emergency physician with the below findings:    Xray cervical spine: No fx, no traumatic subluxation. Ectopic calcification posterior neck in skin/muscle. Straightening of the lordotic curve likely due to spasm.     Xray chest: No infiltrate, no rib fractures, no pleural effusion, no focal consolidative infiltrate, no obvious bony lytic lesions. Interpretation per the Radiologist below, if available at the time ofthis note:    XR CERVICAL SPINE (4-5 VIEWS)   Final Result    There is no evidence of acute fracture or subluxation. XR CHEST (2 VW)   Final Result   No radiographic evidence of acute intrathoracic process. ED BEDSIDE ULTRASOUND:   Performed by ED Physician - none    LABS:  Labs Reviewed - No data to display    All other labs were within normal range or not returned as of this dictation. EMERGENCY DEPARTMENT COURSE and DIFFERENTIAL DIAGNOSIS/MDM:   Vitals:    Vitals:    04/22/21 1043 04/22/21 1212   BP: 126/80 128/82   Pulse: 80 78   Resp: 18 18   Temp: 97.2 °F (36.2 °C) 98.1 °F (36.7 °C)   TempSrc: Oral Oral   SpO2: 97%    Weight: 280 lb (127 kg)    Height: 5' 9\" (1.753 m)            MDM  Patient has 7 days of tingling to the left thumb and left index finger. On exam palpation of the first rib which is depressed on the left reproduces the exact same symptoms. Also exam patient has paravertebral muscle spasm as well as left trapezius muscle spasm. Patient is in pain management and prescribed Percocet. ED attending explained to the patient that she will take an anti-inflammatory as well as a muscle relaxant but cannot take the Percocet while she is taking the muscle relaxant. ER physician also explained that it is a controlled substance and if it is lost or stolen it will not be replaced and also if it is lost or stolen she would have to file a police report. [facility] report reviewed. EKG shows no injury pattern. Patient has no pleuritic pain. Patient sleep position is on side and likely accounts for thoracic outlet syndrome causing a cervical radiculopathy.   ER physician explained to the patient that if physical therapy and the medicines do not work she will need to follow-up and follow-up with a neurosurgeon who would then do further work-up which may even include an MRI. Patient denies any history of diabetes, hypertension, hyperlipidemia and she is not a smoker. Cardiac risk factors are obesity and age. Patient denies any family history of cardiac disease. The findings were discussed with the patient. The patient was invited to return  to the ER if worse symptoms. The patient verbalized understanding of the care and they have no further questions. CONSULTS:  None    PROCEDURES:  Unless otherwise noted below, none     Procedures    FINAL IMPRESSION      1. Thoracic outlet syndrome of left thoracic outlet    2. Trapezius muscle spasm          DISPOSITION/PLAN   DISPOSITION Decision To Discharge 04/22/2021 11:56:30 AM      PATIENT REFERRED TO:  Adrian Morel DO  [address] [address]  [phone]    Call in 1 day      MD Brian Hicks [address]   [phone]    Call in 1 day  If symptoms worsen      DISCHARGE MEDICATIONS:  Discharge Medication List as of 4/22/2021 12:03 PM      START taking these medications    Details   diazePAM (VALIUM) 10 MG tablet Take 1 tablet by mouth every 6 hours as needed (pain, spasm) for up to 3 days. , Disp-12 tablet, R-0Print      naproxen (NAPROSYN) 500 MG tablet Take 1 tablet by mouth 2 times daily for 20 doses, Disp-20 tablet, R-0Print      Capsaicin 0.1 % CREA Apply 56.6 g topically 3 times daily as needed (neck pain), Disp-60 g, R-0Print                (Please note that portions of this note were completed with a voice recognition program.  Efforts were made to edit the dictations but occasionally words are mis-transcribed.)    Cory Lerner DO (electronically signed)  Attending Emergency Physician          Cory Lerner DO  04/22/21 0850

## 2024-12-16 ENCOUNTER — APPOINTMENT (OUTPATIENT)
Dept: CARDIOLOGY | Facility: HOSPITAL | Age: 54
End: 2024-12-16

## 2024-12-16 ENCOUNTER — HOSPITAL ENCOUNTER (INPATIENT)
Facility: HOSPITAL | Age: 54
LOS: 5 days | Discharge: HOME | End: 2024-12-22
Attending: STUDENT IN AN ORGANIZED HEALTH CARE EDUCATION/TRAINING PROGRAM

## 2024-12-16 ENCOUNTER — APPOINTMENT (OUTPATIENT)
Dept: RADIOLOGY | Facility: HOSPITAL | Age: 54
End: 2024-12-16

## 2024-12-16 DIAGNOSIS — Z82.49 FAMILY HISTORY OF ANEURYSM: ICD-10-CM

## 2024-12-16 DIAGNOSIS — R07.9 CHEST PAIN, UNSPECIFIED TYPE: ICD-10-CM

## 2024-12-16 DIAGNOSIS — M54.9 ACUTE MIDLINE BACK PAIN, UNSPECIFIED BACK LOCATION: ICD-10-CM

## 2024-12-16 DIAGNOSIS — R79.89 LFT ELEVATION: ICD-10-CM

## 2024-12-16 DIAGNOSIS — B17.9 ACUTE HEPATITIS: Primary | ICD-10-CM

## 2024-12-16 DIAGNOSIS — T39.1X1A ACCIDENTAL ACETAMINOPHEN OVERDOSE, INITIAL ENCOUNTER: ICD-10-CM

## 2024-12-16 LAB
ALBUMIN SERPL BCP-MCNC: 3.9 G/DL (ref 3.4–5)
ALBUMIN SERPL BCP-MCNC: 4 G/DL (ref 3.4–5)
ALP SERPL-CCNC: 188 U/L (ref 33–110)
ALP SERPL-CCNC: 194 U/L (ref 33–110)
ALT SERPL W P-5'-P-CCNC: 777 U/L (ref 7–45)
ALT SERPL W P-5'-P-CCNC: 801 U/L (ref 7–45)
ANION GAP SERPL CALC-SCNC: 11 MMOL/L (ref 10–20)
ANION GAP SERPL CALC-SCNC: 13 MMOL/L (ref 10–20)
APAP SERPL-MCNC: <10 UG/ML
APTT PPP: 32 SECONDS (ref 27–38)
AST SERPL W P-5'-P-CCNC: 464 U/L (ref 9–39)
AST SERPL W P-5'-P-CCNC: 500 U/L (ref 9–39)
BASOPHILS # BLD AUTO: 0.08 X10*3/UL (ref 0–0.1)
BASOPHILS NFR BLD AUTO: 1.1 %
BILIRUB SERPL-MCNC: 1 MG/DL (ref 0–1.2)
BILIRUB SERPL-MCNC: 1.2 MG/DL (ref 0–1.2)
BNP SERPL-MCNC: 14 PG/ML (ref 0–99)
BUN SERPL-MCNC: 6 MG/DL (ref 6–23)
BUN SERPL-MCNC: 6 MG/DL (ref 6–23)
CALCIUM SERPL-MCNC: 9.1 MG/DL (ref 8.6–10.3)
CALCIUM SERPL-MCNC: 9.1 MG/DL (ref 8.6–10.3)
CARDIAC TROPONIN I PNL SERPL HS: 3 NG/L (ref 0–13)
CARDIAC TROPONIN I PNL SERPL HS: 3 NG/L (ref 0–13)
CHLORIDE SERPL-SCNC: 102 MMOL/L (ref 98–107)
CHLORIDE SERPL-SCNC: 104 MMOL/L (ref 98–107)
CO2 SERPL-SCNC: 23 MMOL/L (ref 21–32)
CO2 SERPL-SCNC: 28 MMOL/L (ref 21–32)
CREAT SERPL-MCNC: 0.55 MG/DL (ref 0.5–1.05)
CREAT SERPL-MCNC: 0.64 MG/DL (ref 0.5–1.05)
EGFRCR SERPLBLD CKD-EPI 2021: >90 ML/MIN/1.73M*2
EGFRCR SERPLBLD CKD-EPI 2021: >90 ML/MIN/1.73M*2
EOSINOPHIL # BLD AUTO: 0.39 X10*3/UL (ref 0–0.7)
EOSINOPHIL NFR BLD AUTO: 5.2 %
ERYTHROCYTE [DISTWIDTH] IN BLOOD BY AUTOMATED COUNT: 14.3 % (ref 11.5–14.5)
GLUCOSE SERPL-MCNC: 84 MG/DL (ref 74–99)
GLUCOSE SERPL-MCNC: 89 MG/DL (ref 74–99)
HCT VFR BLD AUTO: 39.4 % (ref 36–46)
HGB BLD-MCNC: 13.2 G/DL (ref 12–16)
HOLD SPECIMEN: NORMAL
HOLD SPECIMEN: NORMAL
IMM GRANULOCYTES # BLD AUTO: 0.04 X10*3/UL (ref 0–0.7)
IMM GRANULOCYTES NFR BLD AUTO: 0.5 % (ref 0–0.9)
INR PPP: 1.1 (ref 0.9–1.1)
LIPASE SERPL-CCNC: 30 U/L (ref 9–82)
LYMPHOCYTES # BLD AUTO: 1.76 X10*3/UL (ref 1.2–4.8)
LYMPHOCYTES NFR BLD AUTO: 23.5 %
MCH RBC QN AUTO: 30.8 PG (ref 26–34)
MCHC RBC AUTO-ENTMCNC: 33.5 G/DL (ref 32–36)
MCV RBC AUTO: 92 FL (ref 80–100)
MONOCYTES # BLD AUTO: 0.45 X10*3/UL (ref 0.1–1)
MONOCYTES NFR BLD AUTO: 6 %
NEUTROPHILS # BLD AUTO: 4.78 X10*3/UL (ref 1.2–7.7)
NEUTROPHILS NFR BLD AUTO: 63.7 %
NRBC BLD-RTO: 0 /100 WBCS (ref 0–0)
PLATELET # BLD AUTO: 232 X10*3/UL (ref 150–450)
POTASSIUM SERPL-SCNC: 3.7 MMOL/L (ref 3.5–5.3)
POTASSIUM SERPL-SCNC: 5.5 MMOL/L (ref 3.5–5.3)
PROT SERPL-MCNC: 7.4 G/DL (ref 6.4–8.2)
PROT SERPL-MCNC: 7.7 G/DL (ref 6.4–8.2)
PROTHROMBIN TIME: 12.3 SECONDS (ref 9.8–12.8)
RBC # BLD AUTO: 4.29 X10*6/UL (ref 4–5.2)
SODIUM SERPL-SCNC: 135 MMOL/L (ref 136–145)
SODIUM SERPL-SCNC: 136 MMOL/L (ref 136–145)
WBC # BLD AUTO: 7.5 X10*3/UL (ref 4.4–11.3)

## 2024-12-16 PROCEDURE — 99291 CRITICAL CARE FIRST HOUR: CPT | Performed by: STUDENT IN AN ORGANIZED HEALTH CARE EDUCATION/TRAINING PROGRAM

## 2024-12-16 PROCEDURE — 93005 ELECTROCARDIOGRAM TRACING: CPT

## 2024-12-16 PROCEDURE — 2500000001 HC RX 250 WO HCPCS SELF ADMINISTERED DRUGS (ALT 637 FOR MEDICARE OP): Performed by: PHYSICIAN ASSISTANT

## 2024-12-16 PROCEDURE — 2550000001 HC RX 255 CONTRASTS: Performed by: STUDENT IN AN ORGANIZED HEALTH CARE EDUCATION/TRAINING PROGRAM

## 2024-12-16 PROCEDURE — 80143 DRUG ASSAY ACETAMINOPHEN: CPT | Performed by: PHYSICIAN ASSISTANT

## 2024-12-16 PROCEDURE — 96375 TX/PRO/DX INJ NEW DRUG ADDON: CPT

## 2024-12-16 PROCEDURE — 83690 ASSAY OF LIPASE: CPT | Performed by: PHYSICIAN ASSISTANT

## 2024-12-16 PROCEDURE — 96374 THER/PROPH/DIAG INJ IV PUSH: CPT

## 2024-12-16 PROCEDURE — 2500000004 HC RX 250 GENERAL PHARMACY W/ HCPCS (ALT 636 FOR OP/ED): Performed by: PHYSICIAN ASSISTANT

## 2024-12-16 PROCEDURE — 99291 CRITICAL CARE FIRST HOUR: CPT | Mod: 25 | Performed by: STUDENT IN AN ORGANIZED HEALTH CARE EDUCATION/TRAINING PROGRAM

## 2024-12-16 PROCEDURE — 74174 CTA ABD&PLVS W/CONTRAST: CPT | Performed by: STUDENT IN AN ORGANIZED HEALTH CARE EDUCATION/TRAINING PROGRAM

## 2024-12-16 PROCEDURE — 84484 ASSAY OF TROPONIN QUANT: CPT | Performed by: PHYSICIAN ASSISTANT

## 2024-12-16 PROCEDURE — 36415 COLL VENOUS BLD VENIPUNCTURE: CPT | Performed by: PHYSICIAN ASSISTANT

## 2024-12-16 PROCEDURE — 83880 ASSAY OF NATRIURETIC PEPTIDE: CPT | Performed by: PHYSICIAN ASSISTANT

## 2024-12-16 PROCEDURE — 85610 PROTHROMBIN TIME: CPT | Performed by: PHYSICIAN ASSISTANT

## 2024-12-16 PROCEDURE — 71045 X-RAY EXAM CHEST 1 VIEW: CPT | Mod: FOREIGN READ | Performed by: RADIOLOGY

## 2024-12-16 PROCEDURE — 71275 CT ANGIOGRAPHY CHEST: CPT | Performed by: STUDENT IN AN ORGANIZED HEALTH CARE EDUCATION/TRAINING PROGRAM

## 2024-12-16 PROCEDURE — 76705 ECHO EXAM OF ABDOMEN: CPT

## 2024-12-16 PROCEDURE — 96376 TX/PRO/DX INJ SAME DRUG ADON: CPT

## 2024-12-16 PROCEDURE — 71045 X-RAY EXAM CHEST 1 VIEW: CPT

## 2024-12-16 PROCEDURE — 71275 CT ANGIOGRAPHY CHEST: CPT

## 2024-12-16 PROCEDURE — 93010 ELECTROCARDIOGRAM REPORT: CPT | Performed by: PHYSICIAN ASSISTANT

## 2024-12-16 PROCEDURE — 80053 COMPREHEN METABOLIC PANEL: CPT | Performed by: PHYSICIAN ASSISTANT

## 2024-12-16 PROCEDURE — 86705 HEP B CORE ANTIBODY IGM: CPT | Mod: STJLAB | Performed by: PHYSICIAN ASSISTANT

## 2024-12-16 PROCEDURE — 85025 COMPLETE CBC W/AUTO DIFF WBC: CPT | Performed by: PHYSICIAN ASSISTANT

## 2024-12-16 PROCEDURE — 2500000004 HC RX 250 GENERAL PHARMACY W/ HCPCS (ALT 636 FOR OP/ED): Performed by: STUDENT IN AN ORGANIZED HEALTH CARE EDUCATION/TRAINING PROGRAM

## 2024-12-16 PROCEDURE — 74174 CTA ABD&PLVS W/CONTRAST: CPT

## 2024-12-16 PROCEDURE — 76705 ECHO EXAM OF ABDOMEN: CPT | Performed by: RADIOLOGY

## 2024-12-16 PROCEDURE — 80074 ACUTE HEPATITIS PANEL: CPT | Mod: STJLAB | Performed by: PHYSICIAN ASSISTANT

## 2024-12-16 PROCEDURE — 2500000005 HC RX 250 GENERAL PHARMACY W/O HCPCS: Performed by: PHYSICIAN ASSISTANT

## 2024-12-16 RX ORDER — FENTANYL CITRATE 50 UG/ML
25 INJECTION, SOLUTION INTRAMUSCULAR; INTRAVENOUS ONCE
Status: COMPLETED | OUTPATIENT
Start: 2024-12-16 | End: 2024-12-16

## 2024-12-16 RX ORDER — LORAZEPAM 2 MG/ML
1 INJECTION INTRAMUSCULAR ONCE AS NEEDED
Status: COMPLETED | OUTPATIENT
Start: 2024-12-16 | End: 2024-12-16

## 2024-12-16 RX ORDER — LORAZEPAM 2 MG/ML
1 INJECTION INTRAMUSCULAR ONCE
Status: COMPLETED | OUTPATIENT
Start: 2024-12-16 | End: 2024-12-16

## 2024-12-16 RX ORDER — ONDANSETRON HYDROCHLORIDE 2 MG/ML
4 INJECTION, SOLUTION INTRAVENOUS ONCE
Status: COMPLETED | OUTPATIENT
Start: 2024-12-16 | End: 2024-12-16

## 2024-12-16 RX ORDER — LIDOCAINE HYDROCHLORIDE 20 MG/ML
15 SOLUTION OROPHARYNGEAL ONCE
Status: COMPLETED | OUTPATIENT
Start: 2024-12-16 | End: 2024-12-16

## 2024-12-16 RX ORDER — MORPHINE SULFATE 4 MG/ML
4 INJECTION, SOLUTION INTRAMUSCULAR; INTRAVENOUS ONCE
Status: COMPLETED | OUTPATIENT
Start: 2024-12-16 | End: 2024-12-16

## 2024-12-16 RX ORDER — FENTANYL CITRATE 50 UG/ML
50 INJECTION, SOLUTION INTRAMUSCULAR; INTRAVENOUS ONCE
Status: DISCONTINUED | OUTPATIENT
Start: 2024-12-16 | End: 2024-12-16

## 2024-12-16 RX ORDER — ALUMINUM HYDROXIDE, MAGNESIUM HYDROXIDE, AND SIMETHICONE 1200; 120; 1200 MG/30ML; MG/30ML; MG/30ML
30 SUSPENSION ORAL ONCE
Status: COMPLETED | OUTPATIENT
Start: 2024-12-16 | End: 2024-12-16

## 2024-12-16 RX ADMIN — LIDOCAINE HYDROCHLORIDE 15 ML: 20 SOLUTION ORAL at 18:53

## 2024-12-16 RX ADMIN — LORAZEPAM 1 MG: 2 INJECTION INTRAMUSCULAR; INTRAVENOUS at 17:51

## 2024-12-16 RX ADMIN — MORPHINE SULFATE 4 MG: 4 INJECTION, SOLUTION INTRAMUSCULAR; INTRAVENOUS at 20:59

## 2024-12-16 RX ADMIN — LORAZEPAM 1 MG: 2 INJECTION, SOLUTION INTRAMUSCULAR; INTRAVENOUS at 18:52

## 2024-12-16 RX ADMIN — FENTANYL CITRATE 25 MCG: 50 INJECTION, SOLUTION INTRAMUSCULAR; INTRAVENOUS at 17:51

## 2024-12-16 RX ADMIN — IOHEXOL 90 ML: 350 INJECTION, SOLUTION INTRAVENOUS at 18:47

## 2024-12-16 RX ADMIN — ALUMINUM HYDROXIDE, MAGNESIUM HYDROXIDE, AND DIMETHICONE 30 ML: 200; 20; 200 SUSPENSION ORAL at 18:53

## 2024-12-16 RX ADMIN — ONDANSETRON 4 MG: 2 INJECTION INTRAMUSCULAR; INTRAVENOUS at 22:47

## 2024-12-16 RX ADMIN — ACETYLCYSTEINE 15 G: 200 INJECTION, SOLUTION INTRAVENOUS at 22:54

## 2024-12-16 RX ADMIN — ONDANSETRON 4 MG: 2 INJECTION INTRAMUSCULAR; INTRAVENOUS at 18:53

## 2024-12-16 ASSESSMENT — LIFESTYLE VARIABLES
EVER HAD A DRINK FIRST THING IN THE MORNING TO STEADY YOUR NERVES TO GET RID OF A HANGOVER: NO
EVER FELT BAD OR GUILTY ABOUT YOUR DRINKING: NO
TOTAL SCORE: 0
HAVE YOU EVER FELT YOU SHOULD CUT DOWN ON YOUR DRINKING: NO
HAVE PEOPLE ANNOYED YOU BY CRITICIZING YOUR DRINKING: NO

## 2024-12-16 ASSESSMENT — PAIN DESCRIPTION - DESCRIPTORS
DESCRIPTORS: BURNING
DESCRIPTORS: TIGHTNESS
DESCRIPTORS: TIGHTNESS

## 2024-12-16 ASSESSMENT — PAIN DESCRIPTION - ONSET: ONSET: ONGOING

## 2024-12-16 ASSESSMENT — PAIN DESCRIPTION - FREQUENCY: FREQUENCY: CONSTANT/CONTINUOUS

## 2024-12-16 ASSESSMENT — PAIN DESCRIPTION - LOCATION
LOCATION: CHEST
LOCATION: CHEST

## 2024-12-16 ASSESSMENT — PAIN SCALES - GENERAL
PAINLEVEL_OUTOF10: 7
PAINLEVEL_OUTOF10: 10 - WORST POSSIBLE PAIN

## 2024-12-16 ASSESSMENT — PAIN DESCRIPTION - PROGRESSION: CLINICAL_PROGRESSION: NOT CHANGED

## 2024-12-16 ASSESSMENT — PAIN DESCRIPTION - PAIN TYPE
TYPE: ACUTE PAIN
TYPE: ACUTE PAIN

## 2024-12-16 ASSESSMENT — PAIN - FUNCTIONAL ASSESSMENT: PAIN_FUNCTIONAL_ASSESSMENT: 0-10

## 2024-12-16 ASSESSMENT — PAIN DESCRIPTION - ORIENTATION: ORIENTATION: MID

## 2024-12-16 NOTE — ED PROVIDER NOTES
Emergency Department Provider Note        History of Present Illness     History provided by: Patient  Limitations to History: None  External Records Reviewed with Brief Summary: None    HPI:  Deepthi Wild is a 54 y.o. female who has a history of acid reflux, mild CAD, fatty liver who is otherwise pretty healthy who presents today for evaluation of chest pain radiating into the back.  Patient states she suffers from some acid reflux and for the past week has been having intermittent episodes that feeling previous acid reflux that she has had but then today an hour ago she started experiencing something that feels different.  Patient states it feels like a tearing pain in the center of her abdomen that radiates into the back and goes to both flanks.  Patient has never had anything like this before.  It started an hour ago while she was driving, not postprandial, she last ate at 1 PM.  Patient denies any numbness or tingling into the arms or legs.  Denies any personal history of aneurysms however states both her mother and grandmother  of brain aneurysms that had ruptured.  Patient denies history of hypertension, hyperlipidemia, diabetes, denies any previous smoking history or drug use.  Patient does have a family history of A-fib diabetes and aneurysms but no MIs in young people.  She has had some associated nausea without vomiting.  No shortness of breath.  No pain in her arm neck jaw or shoulder.    Physical Exam   Triage vitals:  T 36.8 °C (98.2 °F)  HR 93  /75  RR 18  O2 98 % None (Room air)    Physical Exam  Vitals and nursing note reviewed.   Constitutional:       General: She is not in acute distress.     Appearance: Normal appearance. She is not toxic-appearing.      Comments: Patient overall well-appearing, not diaphoretic, in no acute distress   HENT:      Head: Normocephalic and atraumatic.      Nose: Nose normal.   Eyes:      Extraocular Movements: Extraocular movements intact.    Cardiovascular:      Rate and Rhythm: Normal rate and regular rhythm.      Comments: 2+ radial and PT pulses, symmetrical bilaterally.    Right arm blood pressure 132/72   Left arm blood pressure 117/73  R arm BP repeated 133/74  Pulmonary:      Effort: Pulmonary effort is normal.   Abdominal:      Palpations: Abdomen is soft.      Comments: Mild epigastric tenderness, negative Sanabria sign.   Musculoskeletal:         General: Normal range of motion.      Cervical back: Normal range of motion and neck supple.   Skin:     General: Skin is warm and dry.   Neurological:      General: No focal deficit present.      Mental Status: She is alert.   Psychiatric:         Mood and Affect: Mood normal.         Thought Content: Thought content normal.        XR chest 1 view   Final Result      CT angio chest abdomen pelvis   Final Result   1. No thoracic or abdominal aortic aneurysm or acute aortic pathology.   2. No acute abnormality of the chest, abdomen or pelvis.   3. Liver is borderline enlarged, measuring up to 20 cm in   craniocaudal dimension, and demonstrates diffusely decreased   attenuation suggestive of fatty infiltration. Correlate with serum   LFTs.        MACRO:   None.        Signed by: Kenyatta Pichardo 12/16/2024 7:16 PM   Dictation workstation:   [id number]      US right upper quadrant    (Results Pending)     Labs Reviewed   COMPREHENSIVE METABOLIC PANEL - Abnormal       Result Value    Glucose 89      Sodium 135 (*)     Potassium 5.5 (*)     Chloride 102      Bicarbonate 28      Anion Gap 11      Urea Nitrogen 6      Creatinine 0.64      eGFR >90      Calcium 9.1      Albumin 4.0      Alkaline Phosphatase 194 (*)     Total Protein 7.7       (*)     Bilirubin, Total 1.0       (*)    COMPREHENSIVE METABOLIC PANEL - Abnormal    Glucose 84      Sodium 136      Potassium 3.7      Chloride 104      Bicarbonate 23      Anion Gap 13      Urea Nitrogen 6      Creatinine 0.55      eGFR >90       Calcium 9.1      Albumin 3.9      Alkaline Phosphatase 188 (*)     Total Protein 7.4       (*)     Bilirubin, Total 1.2       (*)    LIPASE - Normal    Lipase 30      Narrative:     Venipuncture immediately after or during the administration of Metamizole may lead to falsely low results. Testing should be performed immediately prior to Metamizole dosing.   COAGULATION SCREEN - Normal    Protime 12.3      INR 1.1      aPTT 32      Narrative:     The APTT is no longer used for monitoring Unfractionated Heparin Therapy. For monitoring Heparin Therapy, use the Heparin Assay.   B-TYPE NATRIURETIC PEPTIDE - Normal    BNP 14      Narrative:        <100 pg/mL - Heart failure unlikely  100-299 pg/mL - Intermediate probability of acute heart                  failure exacerbation. Correlate with clinical                  context and patient history.    >=300 pg/mL - Heart Failure likely. Correlate with clinical                  context and patient history.    BNP testing is performed using different testing methodology at [facility] than at other [facility]. Direct result comparisons should only be made within the same method.      SERIAL TROPONIN-INITIAL - Normal    Troponin I, High Sensitivity 3      Narrative:     Less than 99th percentile of normal range cutoff-  Female and children under 18 years old <14 ng/L; Male <21 ng/L: Negative  Repeat testing should be performed if clinically indicated.     Female and children under 18 years old 14-50 ng/L; Male 21-50 ng/L:  Consistent with possible cardiac damage and possible increased clinical   risk. Serial measurements may help to assess extent of myocardial damage.     >50 ng/L: Consistent with cardiac damage, increased clinical risk and  myocardial infarction. Serial measurements may help assess extent of   myocardial damage.      NOTE: Children less than 1 year old may have higher baseline troponin   levels and results should be interpreted  in conjunction with the overall   clinical context.     NOTE: Troponin I testing is performed using a different   testing methodology at [facility] than at other   system [facility]. Direct result comparisons should only   be made within the same method.   SERIAL TROPONIN, 1 HOUR - Normal    Troponin I, High Sensitivity 3      Narrative:     Less than 99th percentile of normal range cutoff-  Female and children under 18 years old <14 ng/L; Male <21 ng/L: Negative  Repeat testing should be performed if clinically indicated.     Female and children under 18 years old 14-50 ng/L; Male 21-50 ng/L:  Consistent with possible cardiac damage and possible increased clinical   risk. Serial measurements may help to assess extent of myocardial damage.     >50 ng/L: Consistent with cardiac damage, increased clinical risk and  myocardial infarction. Serial measurements may help assess extent of   myocardial damage.      NOTE: Children less than 1 year old may have higher baseline troponin   levels and results should be interpreted in conjunction with the overall   clinical context.     NOTE: Troponin I testing is performed using a different   testing methodology at [facility] than at other   [facility]. Direct result comparisons should only   be made within the same method.   ACETAMINOPHEN - Normal    Acetaminophen <10.0     CBC WITH AUTO DIFFERENTIAL    WBC 7.5      nRBC 0.0      RBC 4.29      Hemoglobin 13.2      Hematocrit 39.4      MCV 92      MCH 30.8      MCHC 33.5      RDW 14.3      Platelets 232      Neutrophils % 63.7      Immature Granulocytes %, Automated 0.5      Lymphocytes % 23.5      Monocytes % 6.0      Eosinophils % 5.2      Basophils % 1.1      Neutrophils Absolute 4.78      Immature Granulocytes Absolute, Automated 0.04      Lymphocytes Absolute 1.76      Monocytes Absolute 0.45      Eosinophils Absolute 0.39      Basophils Absolute 0.08     TROPONIN SERIES- (INITIAL, 1 HR)     Narrative:     The following orders were created for panel order Troponin I Series, High Sensitivity (0, 1 HR).  Procedure                               Abnormality         Status                     ---------                               -----------         ------                     Troponin I, High Sensiti...[[id number]]  Normal              Final result               Troponin, High Sensitivi...[[id number]]  Normal              Final result                 Please view results for these tests on the individual orders.   HEPATITIS PANEL, ACUTE       Medical Decision Making & ED Course   Medical Decision Makin y.o. female presents today for evaluation of epigastric abdominal pain that radiates into the back and both flanks, she also feels in the lower part of her chest.  Given patient's family history of aneurysms and the fact that the pain is tearing and ripping into the back and both flanks, this does raise concern for dissection, she does have a normal blood pressure, no previous smoking history however we did obtain a CTA chest abdomen pelvis.  There may also be a component of gastritis, I did also obtain a CBC CMP lipase, gave patient a GI cocktail as well as Zofran, Troponin and BNP.  Patient's troponins are flat at 3 and 3, coags are normal, her CMP did show an acutely elevated ALT of 777, AST of 464 and alk phos of 188, normal total bilirubin, her lipase and BNP are normal, CBC unremarkable, CT angio chest abdomen pelvis showed a normal gallbladder, no signs of dissection.  Patient was reassessed and I spoke with her regarding her acute LFT elevation which was normal in  of last year, patient does endorse that she has been taking a lot of Tylenol for her fibromyalgia, takes between 4 and 8 tablets/day for the last 3 weeks she also recently traveled to [facility], does not recall if she ate any unwashed fruit or vegetables.  I did add on Tylenol level, hepatitis panel as well as a right upper  quadrant ultrasound.  Will be signed out to incoming resident, Yifan Husain pending results.   ----      Differential diagnoses considered include but are not limited to: Dissection, GERD, pancreatitis, acute cholecystitis, hepatitis, Tylenol toxicity etc.     Social Determinants of Health which Significantly Impact Care: None identified     EKG Independent Interpretation: EKG interpreted by myself. Please see ED Course for full interpretation.    Independent Result Review and Interpretation: Relevant laboratory and radiographic results were reviewed and independently interpreted by myself.  As necessary, they are commented on in the ED Course.    Chronic conditions affecting the patient's care: As documented above in [facility]    The patient was discussed with the following consultants/services: None    Care Considerations: As documented above in [facility]    ED Course:  ED Course as of 12/17/24 1800   Mon Dec 16, 2024   1743 CT Carlotta made aware not to wait on labs and concern for dissection. [MK]   1746 At patient's request called and spoke to her spouse at this time.  Updated him on her current situation. [TL]   1803 EKG performed at 17: 12 and independently reviewed by provider: Reveals NSR with a rate of 83 bpm, normal axis, normal intervals, no ST changes, no T wave abnormalities, no ectopy. No STEMI.  Overall low voltage. [TL]   1841 Significant delay in obtaining CT at this time secondary patient anxiety about CT.  Had to be carefully talked through the process. [TL]   1859 No obvious dissection on my read of CTA [MK]   1919 ALT(!): 801  LFTs in June 2023 showed a normal total and direct bilirubin as well as normal ALT and AST [MK]   1951 Initial troponin negative.  BNP within normal limits.  Lipase within normal limits. [TL]   1952 IMPRESSION:  1. No thoracic or abdominal aortic aneurysm or acute aortic pathology.  2. No acute abnormality of the chest, abdomen or pelvis.  3. Liver is borderline enlarged, measuring up  to 20 cm in  craniocaudal dimension, and demonstrates diffusely decreased  attenuation suggestive of fatty infiltration. Correlate with serum  LFTs.      MACRO:  None.   [TL]   1952 No sign of aortic pathology.  Possible fatty liver.  Initial elevated liver enzymes on metabolic panel however was significantly hemolyzed.  Mild hyperkalemia with normal kidney function.  No sign of this on EKG.  Metabolic panel repeat pending given possibly inaccurate results due to hemolysis. [TL]   2032 Prior liver enzymes assessed in 2023 at [facility] system appear to be normal at that time. [TL]   2033 Patient with persistent 7 out of 10 chest pain at this time.  4 mg of morphine to be given while patient layo NPO. [TL]   2050 Normal potassium on repeat metabolic panel with no hemolysis.  However liver enzymes remain elevated.  Right upper quadrant ultrasound to be obtained.  To further evaluate the hepatobiliary system. [TL]   2057 I spoke with the patient at this time regarding her acute LFT elevation, obtain further history, patient does have a history of fatty liver however her labs from a year ago were normal, patient does endorse that she is been taking a lot of Tylenol lately, states she takes it for fibromyalgia and also because she came down with a cold a couple of weeks ago, states she is definitely taken between 4 and 8 tablets of Tylenol daily for the last 3 weeks and has definitely gone through more than 30 tabs during that timeframe.  She also recently traveled to the [facility], was in [place] November 2, she does not recall whether she ate any unwashed fruits or vegetables however there is potential for both Tylenol toxicity as well as hepatitis based on history. [MK]   2115 Spoke to poison control at this time and given recent excessive Tylenol use with signs of elevated liver enzymes/liver injury they would recommend starting NAC.  Order placed.  Nursing aware to obtain weight outpatient for weight-based dosing.  [TL]   2206 IMPRESSION:  No acute sonographic abnormality allowing for limitations above.      MACRO:  None.   [TL]   2214 Patient and family updated on plan of care and plan for hospitalization.  She is agreeable. [TL]      ED Course User Index  [MK] Trinidad Boggs PA-C  [TL] Luis A Watkins DO         Diagnoses as of 12/17/24 1800   Chest pain, unspecified type   Family history of aneurysm   Acute midline back pain, unspecified back location   LFT elevation   Accidental acetaminophen overdose, initial encounter   Acute hepatitis     Disposition   Patient was signed out to attending Luis A Watkins at 9pm pending completion of their work-up.  Please see the next provider's transition of care note for the remainder of the patient's care.     Procedures   Critical Care    Performed by: Luis A Watkins DO  Authorized by: Luis A Watkins DO    Critical care provider statement:     Critical care time (minutes):  35    Critical care time was exclusive of:  Separately billable procedures and treating other patients and teaching time    Critical care was necessary to treat or prevent imminent or life-threatening deterioration of the following conditions:  Toxidrome (Treatment of acute liver injury from unintentional Tylenol overdose as well as emergent evaluation and care for concern of acute aortic dissection.)    Critical care was time spent personally by me on the following activities:  Ordering and performing treatments and interventions, ordering and review of laboratory studies, ordering and review of radiographic studies, pulse oximetry, re-evaluation of patient's condition, review of old charts, obtaining history from patient or surrogate, evaluation of patient's response to treatment, development of treatment plan with patient or surrogate, discussions with consultants and examination of patient    Care discussed with: admitting provider        This was a shared visit with an ED attending.  The patient was seen and  discussed with the ED attending    Trinidad Boggs PA-C  Emergency Medicine       Trinidad Boggs PA-C  12/16/24 2101       Trinidad Boggs PA-C  12/16/24 2110      I performed a history and physical examination of the patient and discussed his management with the resident physician.  I agree with the history, physical, assessment, and plan of care, with the following exceptions:   After physician assistant sign of the patient I did discuss patient's case with poison control who did recommend starting NAC for Tylenol overdose.  She was then admitted to the internal medicine team for continued treatment of her liver injury.  Chest pain workup was otherwise unremarkable with no sign of acute cardiopulmonary process, ACS, pulmonary embolism.    I was present for key and critical portions of the following procedures: None  Time Spent in Critical Care of the patient: None  Time spent in discussions with the patient and family: 30    DO Luis A Jarquin DO  12/17/24 1809

## 2024-12-17 LAB
ALBUMIN SERPL BCP-MCNC: 3.6 G/DL (ref 3.4–5)
ALP SERPL-CCNC: 175 U/L (ref 33–110)
ALT SERPL W P-5'-P-CCNC: 652 U/L (ref 7–45)
ANION GAP SERPL CALC-SCNC: 13 MMOL/L (ref 10–20)
AST SERPL W P-5'-P-CCNC: 306 U/L (ref 9–39)
BILIRUB SERPL-MCNC: 1.2 MG/DL (ref 0–1.2)
BUN SERPL-MCNC: 6 MG/DL (ref 6–23)
CALCIUM SERPL-MCNC: 9 MG/DL (ref 8.6–10.3)
CHLORIDE SERPL-SCNC: 102 MMOL/L (ref 98–107)
CO2 SERPL-SCNC: 25 MMOL/L (ref 21–32)
CREAT SERPL-MCNC: 0.52 MG/DL (ref 0.5–1.05)
EGFRCR SERPLBLD CKD-EPI 2021: >90 ML/MIN/1.73M*2
ERYTHROCYTE [DISTWIDTH] IN BLOOD BY AUTOMATED COUNT: 13.5 % (ref 11.5–14.5)
GLUCOSE SERPL-MCNC: 116 MG/DL (ref 74–99)
HAV IGM SER QL: NONREACTIVE
HBV CORE IGM SER QL: NONREACTIVE
HBV SURFACE AG SERPL QL IA: NONREACTIVE
HCT VFR BLD AUTO: 39.6 % (ref 36–46)
HCV AB SER QL: NONREACTIVE
HGB BLD-MCNC: 12.8 G/DL (ref 12–16)
INR PPP: 1.3 (ref 0.9–1.1)
MAGNESIUM SERPL-MCNC: 2.08 MG/DL (ref 1.6–2.4)
MCH RBC QN AUTO: 30 PG (ref 26–34)
MCHC RBC AUTO-ENTMCNC: 32.3 G/DL (ref 32–36)
MCV RBC AUTO: 93 FL (ref 80–100)
NRBC BLD-RTO: 0 /100 WBCS (ref 0–0)
PLATELET # BLD AUTO: 192 X10*3/UL (ref 150–450)
POTASSIUM SERPL-SCNC: 3.9 MMOL/L (ref 3.5–5.3)
PROT SERPL-MCNC: 6.9 G/DL (ref 6.4–8.2)
PROTHROMBIN TIME: 14.1 SECONDS (ref 9.8–12.8)
RBC # BLD AUTO: 4.26 X10*6/UL (ref 4–5.2)
SODIUM SERPL-SCNC: 136 MMOL/L (ref 136–145)
WBC # BLD AUTO: 6.5 X10*3/UL (ref 4.4–11.3)

## 2024-12-17 PROCEDURE — 85610 PROTHROMBIN TIME: CPT

## 2024-12-17 PROCEDURE — 80053 COMPREHEN METABOLIC PANEL: CPT

## 2024-12-17 PROCEDURE — 1200000002 HC GENERAL ROOM WITH TELEMETRY DAILY

## 2024-12-17 PROCEDURE — 85027 COMPLETE CBC AUTOMATED: CPT

## 2024-12-17 PROCEDURE — 83735 ASSAY OF MAGNESIUM: CPT

## 2024-12-17 PROCEDURE — 2500000004 HC RX 250 GENERAL PHARMACY W/ HCPCS (ALT 636 FOR OP/ED)

## 2024-12-17 PROCEDURE — 36415 COLL VENOUS BLD VENIPUNCTURE: CPT

## 2024-12-17 PROCEDURE — 99222 1ST HOSP IP/OBS MODERATE 55: CPT

## 2024-12-17 PROCEDURE — 2500000004 HC RX 250 GENERAL PHARMACY W/ HCPCS (ALT 636 FOR OP/ED): Performed by: STUDENT IN AN ORGANIZED HEALTH CARE EDUCATION/TRAINING PROGRAM

## 2024-12-17 PROCEDURE — 2500000004 HC RX 250 GENERAL PHARMACY W/ HCPCS (ALT 636 FOR OP/ED): Performed by: INTERNAL MEDICINE

## 2024-12-17 PROCEDURE — 84075 ASSAY ALKALINE PHOSPHATASE: CPT

## 2024-12-17 PROCEDURE — 99223 1ST HOSP IP/OBS HIGH 75: CPT

## 2024-12-17 RX ORDER — KETOROLAC TROMETHAMINE 15 MG/ML
15 INJECTION, SOLUTION INTRAMUSCULAR; INTRAVENOUS ONCE
Status: COMPLETED | OUTPATIENT
Start: 2024-12-17 | End: 2024-12-17

## 2024-12-17 RX ORDER — MORPHINE SULFATE 2 MG/ML
2 INJECTION, SOLUTION INTRAMUSCULAR; INTRAVENOUS ONCE
Status: COMPLETED | OUTPATIENT
Start: 2024-12-17 | End: 2024-12-17

## 2024-12-17 RX ORDER — PROCHLORPERAZINE EDISYLATE 5 MG/ML
10 INJECTION INTRAMUSCULAR; INTRAVENOUS ONCE
Status: COMPLETED | OUTPATIENT
Start: 2024-12-17 | End: 2024-12-17

## 2024-12-17 RX ORDER — PROCHLORPERAZINE EDISYLATE 5 MG/ML
10 INJECTION INTRAMUSCULAR; INTRAVENOUS EVERY 6 HOURS PRN
Status: DISCONTINUED | OUTPATIENT
Start: 2024-12-17 | End: 2024-12-22 | Stop reason: HOSPADM

## 2024-12-17 RX ORDER — HEPARIN SODIUM 5000 [USP'U]/ML
7500 INJECTION, SOLUTION INTRAVENOUS; SUBCUTANEOUS EVERY 8 HOURS SCHEDULED
Status: DISCONTINUED | OUTPATIENT
Start: 2024-12-17 | End: 2024-12-22 | Stop reason: HOSPADM

## 2024-12-17 RX ORDER — FAMOTIDINE 10 MG/ML
20 INJECTION INTRAVENOUS EVERY 12 HOURS SCHEDULED
Status: COMPLETED | OUTPATIENT
Start: 2024-12-17 | End: 2024-12-18

## 2024-12-17 RX ORDER — LIRAGLUTIDE 6 MG/ML
1.8 INJECTION SUBCUTANEOUS DAILY
COMMUNITY

## 2024-12-17 RX ORDER — LIRAGLUTIDE 6 MG/ML
1.8 INJECTION SUBCUTANEOUS DAILY
Status: DISCONTINUED | OUTPATIENT
Start: 2024-12-18 | End: 2024-12-20

## 2024-12-17 RX ORDER — SODIUM CHLORIDE, SODIUM LACTATE, POTASSIUM CHLORIDE, CALCIUM CHLORIDE 600; 310; 30; 20 MG/100ML; MG/100ML; MG/100ML; MG/100ML
100 INJECTION, SOLUTION INTRAVENOUS CONTINUOUS
Status: DISCONTINUED | OUTPATIENT
Start: 2024-12-17 | End: 2024-12-17

## 2024-12-17 RX ORDER — METOCLOPRAMIDE HYDROCHLORIDE 5 MG/ML
10 INJECTION INTRAMUSCULAR; INTRAVENOUS ONCE
Status: COMPLETED | OUTPATIENT
Start: 2024-12-17 | End: 2024-12-17

## 2024-12-17 RX ADMIN — FAMOTIDINE 20 MG: 10 INJECTION, SOLUTION INTRAVENOUS at 10:04

## 2024-12-17 RX ADMIN — METOCLOPRAMIDE 10 MG: 5 INJECTION, SOLUTION INTRAMUSCULAR; INTRAVENOUS at 20:37

## 2024-12-17 RX ADMIN — FAMOTIDINE 20 MG: 10 INJECTION, SOLUTION INTRAVENOUS at 02:26

## 2024-12-17 RX ADMIN — HEPARIN SODIUM 7500 UNITS: 5000 INJECTION, SOLUTION INTRAVENOUS; SUBCUTANEOUS at 01:05

## 2024-12-17 RX ADMIN — HEPARIN SODIUM 7500 UNITS: 5000 INJECTION, SOLUTION INTRAVENOUS; SUBCUTANEOUS at 13:24

## 2024-12-17 RX ADMIN — FAMOTIDINE 20 MG: 10 INJECTION, SOLUTION INTRAVENOUS at 20:17

## 2024-12-17 RX ADMIN — SODIUM CHLORIDE, POTASSIUM CHLORIDE, SODIUM LACTATE AND CALCIUM CHLORIDE 100 ML/HR: 600; 310; 30; 20 INJECTION, SOLUTION INTRAVENOUS at 02:26

## 2024-12-17 RX ADMIN — PROCHLORPERAZINE EDISYLATE 10 MG: 5 INJECTION INTRAMUSCULAR; INTRAVENOUS at 01:06

## 2024-12-17 RX ADMIN — KETOROLAC TROMETHAMINE 15 MG: 15 INJECTION, SOLUTION INTRAMUSCULAR; INTRAVENOUS at 10:03

## 2024-12-17 RX ADMIN — ACETYLCYSTEINE 5 G: 200 INJECTION, SOLUTION INTRAVENOUS at 01:06

## 2024-12-17 RX ADMIN — ACETYLCYSTEINE 10 G: 200 INJECTION, SOLUTION INTRAVENOUS at 05:32

## 2024-12-17 RX ADMIN — HEPARIN SODIUM 7500 UNITS: 5000 INJECTION, SOLUTION INTRAVENOUS; SUBCUTANEOUS at 05:33

## 2024-12-17 RX ADMIN — MORPHINE SULFATE 2 MG: 2 INJECTION, SOLUTION INTRAMUSCULAR; INTRAVENOUS at 20:37

## 2024-12-17 SDOH — ECONOMIC STABILITY: FOOD INSECURITY: WITHIN THE PAST 12 MONTHS, THE FOOD YOU BOUGHT JUST DIDN'T LAST AND YOU DIDN'T HAVE MONEY TO GET MORE.: NEVER TRUE

## 2024-12-17 SDOH — SOCIAL STABILITY: SOCIAL INSECURITY
WITHIN THE LAST YEAR, HAVE YOU BEEN KICKED, HIT, SLAPPED, OR OTHERWISE PHYSICALLY HURT BY YOUR PARTNER OR EX-PARTNER?: NO

## 2024-12-17 SDOH — SOCIAL STABILITY: SOCIAL INSECURITY
WITHIN THE LAST YEAR, HAVE YOU BEEN RAPED OR FORCED TO HAVE ANY KIND OF SEXUAL ACTIVITY BY YOUR PARTNER OR EX-PARTNER?: NO

## 2024-12-17 SDOH — SOCIAL STABILITY: SOCIAL INSECURITY: WITHIN THE LAST YEAR, HAVE YOU BEEN HUMILIATED OR EMOTIONALLY ABUSED IN OTHER WAYS BY YOUR PARTNER OR EX-PARTNER?: NO

## 2024-12-17 SDOH — SOCIAL STABILITY: SOCIAL INSECURITY: WITHIN THE LAST YEAR, HAVE YOU BEEN AFRAID OF YOUR PARTNER OR EX-PARTNER?: NO

## 2024-12-17 SDOH — ECONOMIC STABILITY: FOOD INSECURITY: WITHIN THE PAST 12 MONTHS, YOU WORRIED THAT YOUR FOOD WOULD RUN OUT BEFORE YOU GOT THE MONEY TO BUY MORE.: NEVER TRUE

## 2024-12-17 SDOH — SOCIAL STABILITY: SOCIAL INSECURITY: DO YOU FEEL ANYONE HAS EXPLOITED OR TAKEN ADVANTAGE OF YOU FINANCIALLY OR OF YOUR PERSONAL PROPERTY?: NO

## 2024-12-17 SDOH — ECONOMIC STABILITY: INCOME INSECURITY: IN THE PAST 12 MONTHS HAS THE ELECTRIC, GAS, OIL, OR WATER COMPANY THREATENED TO SHUT OFF SERVICES IN YOUR HOME?: NO

## 2024-12-17 SDOH — SOCIAL STABILITY: SOCIAL INSECURITY: ARE YOU OR HAVE YOU BEEN THREATENED OR ABUSED PHYSICALLY, EMOTIONALLY, OR SEXUALLY BY ANYONE?: NO

## 2024-12-17 SDOH — SOCIAL STABILITY: SOCIAL INSECURITY: ABUSE: ADULT

## 2024-12-17 SDOH — SOCIAL STABILITY: SOCIAL INSECURITY: HAVE YOU HAD ANY THOUGHTS OF HARMING ANYONE ELSE?: NO

## 2024-12-17 SDOH — SOCIAL STABILITY: SOCIAL INSECURITY: DOES ANYONE TRY TO KEEP YOU FROM HAVING/CONTACTING OTHER FRIENDS OR DOING THINGS OUTSIDE YOUR HOME?: NO

## 2024-12-17 SDOH — SOCIAL STABILITY: SOCIAL INSECURITY: DO YOU FEEL UNSAFE GOING BACK TO THE PLACE WHERE YOU ARE LIVING?: NO

## 2024-12-17 SDOH — SOCIAL STABILITY: SOCIAL INSECURITY: HAS ANYONE EVER THREATENED TO HURT YOUR FAMILY OR YOUR PETS?: NO

## 2024-12-17 SDOH — SOCIAL STABILITY: SOCIAL INSECURITY: WERE YOU ABLE TO COMPLETE ALL THE BEHAVIORAL HEALTH SCREENINGS?: YES

## 2024-12-17 SDOH — SOCIAL STABILITY: SOCIAL INSECURITY: ARE THERE ANY APPARENT SIGNS OF INJURIES/BEHAVIORS THAT COULD BE RELATED TO ABUSE/NEGLECT?: NO

## 2024-12-17 SDOH — SOCIAL STABILITY: SOCIAL INSECURITY: HAVE YOU HAD THOUGHTS OF HARMING ANYONE ELSE?: NO

## 2024-12-17 ASSESSMENT — COGNITIVE AND FUNCTIONAL STATUS - GENERAL
MOBILITY SCORE: 24
DAILY ACTIVITIY SCORE: 24
DAILY ACTIVITIY SCORE: 24
MOBILITY SCORE: 24
PATIENT BASELINE BEDBOUND: NO

## 2024-12-17 ASSESSMENT — PAIN SCALES - GENERAL
PAINLEVEL_OUTOF10: 4
PAINLEVEL_OUTOF10: 7
PAINLEVEL_OUTOF10: 0 - NO PAIN
PAINLEVEL_OUTOF10: 3

## 2024-12-17 ASSESSMENT — LIFESTYLE VARIABLES
HOW OFTEN DO YOU HAVE A DRINK CONTAINING ALCOHOL: NEVER
SKIP TO QUESTIONS 9-10: 1
HOW OFTEN DO YOU HAVE 6 OR MORE DRINKS ON ONE OCCASION: NEVER
AUDIT-C TOTAL SCORE: 0
HOW MANY STANDARD DRINKS CONTAINING ALCOHOL DO YOU HAVE ON A TYPICAL DAY: PATIENT DOES NOT DRINK
AUDIT-C TOTAL SCORE: 0

## 2024-12-17 ASSESSMENT — ACTIVITIES OF DAILY LIVING (ADL)
PATIENT'S MEMORY ADEQUATE TO SAFELY COMPLETE DAILY ACTIVITIES?: YES
LACK_OF_TRANSPORTATION: NO
ADEQUATE_TO_COMPLETE_ADL: YES
BATHING: INDEPENDENT
GROOMING: INDEPENDENT
WALKS IN HOME: INDEPENDENT
JUDGMENT_ADEQUATE_SAFELY_COMPLETE_DAILY_ACTIVITIES: YES
DRESSING YOURSELF: INDEPENDENT
FEEDING YOURSELF: INDEPENDENT
TOILETING: INDEPENDENT
HEARING - RIGHT EAR: FUNCTIONAL
HEARING - LEFT EAR: FUNCTIONAL

## 2024-12-17 ASSESSMENT — PATIENT HEALTH QUESTIONNAIRE - PHQ9
1. LITTLE INTEREST OR PLEASURE IN DOING THINGS: NOT AT ALL
SUM OF ALL RESPONSES TO PHQ9 QUESTIONS 1 & 2: 0
2. FEELING DOWN, DEPRESSED OR HOPELESS: NOT AT ALL

## 2024-12-17 ASSESSMENT — PAIN - FUNCTIONAL ASSESSMENT
PAIN_FUNCTIONAL_ASSESSMENT: 0-10
PAIN_FUNCTIONAL_ASSESSMENT: 0-10

## 2024-12-17 ASSESSMENT — PAIN DESCRIPTION - LOCATION: LOCATION: ABDOMEN

## 2024-12-17 NOTE — CARE PLAN
The patient's goals for the shift include      The clinical goals for the shift include Patient would like to be free from nausea      Problem: Pain - Adult  Goal: Verbalizes/displays adequate comfort level or baseline comfort level  Outcome: Progressing     Problem: Safety - Adult  Goal: Free from fall injury  Outcome: Progressing     Problem: Discharge Planning  Goal: Discharge to home or other facility with appropriate resources  Outcome: Progressing     Problem: Chronic Conditions and Co-morbidities  Goal: Patient's chronic conditions and co-morbidity symptoms are monitored and maintained or improved  Outcome: Progressing       0040: Patient arrived from ED via cart and ambulated with a stand by assist to bed in room 3003 on 3 [address]. Patient vomiting upon arrival to the floor. Dr. Cárdenas made aware and he ordered compazine that I administered. Patient vital signs stable.     EOS: Patient has been resting peacefully since the administration of Compazine and has not verbalized any nausea or had any more bouts of emesis. Patient vital signs stable. Patient safety maintained.

## 2024-12-17 NOTE — H&P
History Of Present Illness  54 YOF NAFLD, fibromyalgia, chronic back pain presenting to [facility] ED c/o epigastric pain.    Started today, radiates around the back B/L and also feels it radiates to the chest at times. Endorses associated symptom of Nausea with non-bloody emesis.  He also endorses that she has been taking 5-6 APAP/day for the last 3 to 4 weeks due to a cold she had as well as due to her fibromyalgia.  She also endorses recent travel to the [facility] in the last month.  Denies fever, chills, night sweats, HA, blurry vision, dysphagia/odynphagia, productive cough, MON, orthopnea, palpitations, diarrhea, hematochezia/melena or swelling of lower extremities.    In the ED: AF HDS & SpO2 WNL on RA.  C unremarkable.  CMP showed a new transaminitis with   alk phos 194 notably normal T. bili.  Viral acute hepatitis panel negative.  APAP level negative.  CT angio chest/ABD/pelvis unremarkable except for borderline hepatomegaly.  Due to the high frequency of APAP consumption over the last 3 to 4 weeks, poison control was contacted and case discussed with recommendation to begin the NAC protocol.  Patient admitted for suspected DILI        Past Medical History  She has no past medical history on file.    Surgical History  She has no past surgical history on file.     Social History  She reports that she has never smoked. She has never used smokeless tobacco. No history on file for alcohol use and drug use.    Family History  No family history on file.     Allergies  Patient has no allergy information on record.    Review of Systems    12 pt ROS obtained: positives & pertinent negatives listed in HPI   Physical Exam   Constitutional: A&Ox4, NAD, resting comfortable, somnolent, arouses to verbal/physical stimuli   Head and Face: Atraumatic, normocephalic   Eyes: Normal external exam, EOMI  ENT: Normal external inspection of ears and nose. Oropharynx normal.  Cardiovascular: RRR, S1/S2, no murmurs, rubs, or  gallops, radial pulses +2  Pulmonary: CTAB, no respiratory distress, no wheezing, rales or rhonchi, on RA  Abdomen: +BS, soft, non-tender, nondistended, no guarding rigidity or rebound tenderness, no masses noted  MSK: Negative for edema, No joint swelling, normal movements of all extremities.   Neuro: No focal deficits, normal motor function, normal sensation, follows all commands  Skin- No lesions, contusions, or erythema.  Psychiatric: Judgment intact. Appropriate mood, affect and behavior   Last Recorded Vitals  BP (!) 152/91 (BP Location: Right arm, Patient Position: Lying) Comment: Let RN know  Pulse 80   Temp 36.6 °C (97.9 °F) (Temporal)   Resp 18   Wt 138 kg (303 lb 5.7 oz)   SpO2 92%     Relevant Results  Results for orders placed or performed during the hospital encounter of 12/16/24 (from the past 24 hours)   CBC and Auto Differential   Result Value Ref Range    WBC 7.5 4.4 - 11.3 x10*3/uL    nRBC 0.0 0.0 - 0.0 /100 WBCs    RBC 4.29 4.00 - 5.20 x10*6/uL    Hemoglobin 13.2 12.0 - 16.0 g/dL    Hematocrit 39.4 36.0 - 46.0 %    MCV 92 80 - 100 fL    MCH 30.8 26.0 - 34.0 pg    MCHC 33.5 32.0 - 36.0 g/dL    RDW 14.3 11.5 - 14.5 %    Platelets 232 150 - 450 x10*3/uL    Neutrophils % 63.7 40.0 - 80.0 %    Immature Granulocytes %, Automated 0.5 0.0 - 0.9 %    Lymphocytes % 23.5 13.0 - 44.0 %    Monocytes % 6.0 2.0 - 10.0 %    Eosinophils % 5.2 0.0 - 6.0 %    Basophils % 1.1 0.0 - 2.0 %    Neutrophils Absolute 4.78 1.20 - 7.70 x10*3/uL    Immature Granulocytes Absolute, Automated 0.04 0.00 - 0.70 x10*3/uL    Lymphocytes Absolute 1.76 1.20 - 4.80 x10*3/uL    Monocytes Absolute 0.45 0.10 - 1.00 x10*3/uL    Eosinophils Absolute 0.39 0.00 - 0.70 x10*3/uL    Basophils Absolute 0.08 0.00 - 0.10 x10*3/uL   Comprehensive metabolic panel   Result Value Ref Range    Glucose 89 74 - 99 mg/dL    Sodium 135 (L) 136 - 145 mmol/L    Potassium 5.5 (H) 3.5 - 5.3 mmol/L    Chloride 102 98 - 107 mmol/L    Bicarbonate 28 21 - 32  mmol/L    Anion Gap 11 10 - 20 mmol/L    Urea Nitrogen 6 6 - 23 mg/dL    Creatinine 0.64 0.50 - 1.05 mg/dL    eGFR >90 >60 mL/min/1.73m*2    Calcium 9.1 8.6 - 10.3 mg/dL    Albumin 4.0 3.4 - 5.0 g/dL    Alkaline Phosphatase 194 (H) 33 - 110 U/L    Total Protein 7.7 6.4 - 8.2 g/dL     (H) 9 - 39 U/L    Bilirubin, Total 1.0 0.0 - 1.2 mg/dL     (H) 7 - 45 U/L   Lipase   Result Value Ref Range    Lipase 30 9 - 82 U/L   B-Type Natriuretic Peptide   Result Value Ref Range    BNP 14 0 - 99 pg/mL   Troponin I, High Sensitivity, Initial   Result Value Ref Range    Troponin I, High Sensitivity 3 0 - 13 ng/L   Coagulation Screen   Result Value Ref Range    Protime 12.3 9.8 - 12.8 seconds    INR 1.1 0.9 - 1.1    aPTT 32 27 - 38 seconds   Troponin, High Sensitivity, 1 Hour   Result Value Ref Range    Troponin I, High Sensitivity 3 0 - 13 ng/L   Comprehensive metabolic panel   Result Value Ref Range    Glucose 84 74 - 99 mg/dL    Sodium 136 136 - 145 mmol/L    Potassium 3.7 3.5 - 5.3 mmol/L    Chloride 104 98 - 107 mmol/L    Bicarbonate 23 21 - 32 mmol/L    Anion Gap 13 10 - 20 mmol/L    Urea Nitrogen 6 6 - 23 mg/dL    Creatinine 0.55 0.50 - 1.05 mg/dL    eGFR >90 >60 mL/min/1.73m*2    Calcium 9.1 8.6 - 10.3 mg/dL    Albumin 3.9 3.4 - 5.0 g/dL    Alkaline Phosphatase 188 (H) 33 - 110 U/L    Total Protein 7.4 6.4 - 8.2 g/dL     (H) 9 - 39 U/L    Bilirubin, Total 1.2 0.0 - 1.2 mg/dL     (H) 7 - 45 U/L   Lavender Top   Result Value Ref Range    Extra Tube Hold for add-ons.    SST TOP   Result Value Ref Range    Extra Tube Hold for add-ons.    Acetaminophen level   Result Value Ref Range    Acetaminophen <10.0 10.0 - 30.0 ug/mL   Hepatitis panel, acute   Result Value Ref Range    Hepatitis B Surface AG Nonreactive Nonreactive    Hepatitis A  AB- IgM Nonreactive Nonreactive    Hepatitis B Core AB; IgM Nonreactive Nonreactive    Hepatitis C AB Nonreactive Nonreactive              Assessment/Plan   Assessment  & Plan  Acute hepatitis  Suspected DILI  Hx of NAFLD  Hx of fibromyalgia   Hx of GERD    54 YOF NAFLD, fibromyalgia, chronic back pain presenting to [facility] ED c/o epigastric pain.  Significant for new transaminitis with normal bilirubin, concern for DILI as patient has endorsed daily APAP use for the last 3 to 4 weeks, she also recently returned from the [facility], treated with Augmentin for URI.  Case discussed with poison control who recommended starting NAC for subacute APAP toxicity.  Of note her INR, renal function were WNL.    - Continue NAC protocol  - Trend LFTs  - IVF, antiemetics   - GI consulted appreciate recommendations  - Follow up with poison control  - Avoid hepatotoxic drugs    IVF: LR  DVT Ppx: Heparin  Diet: regular  Code Status: FULL CODE     Admitted for acute hepatitis, with suspected DILI secondary to subacute APAP toxicity superimposed on history of NAFLD.  Complexity high anticipate > 2 [place] stays           Benito Cárdenas DO

## 2024-12-17 NOTE — NURSING NOTE
Pt calm and cooperative through shift. Pt did not complain of pain this shift. Pt rested through shift and finished IV fluids. Pt tolerated diet and had no nausea this shift. Pt up to bathroom this shift.

## 2024-12-17 NOTE — CONSULTS
Inpatient consult to Gastroenterology  Consult performed by: Best Vick DO  Consult ordered by: Benito Cárdenas DO          Reason For Consult  Transaminitis concern for Tylenol toxic injury.     History Of Present Illness  Deepthi Wild is a 54 y.o. female presenting with chief complaint of epigastric pain.  PMHx significant for NAFLD, fibromyalgia, GERD, chronic back pain and prediabetes.     Patient seen and examined at bedside found lying comfortably in bed in no apparent distress.  Patient's  also at bedside.  Patient able to provide history/good historian.  She endorses experiencing gradual onset of epigastric abdominal pain over the past few days, worsening yesterday (day of presentation).  Initially she thought it was heartburn, noting in the past she is taken something for the heartburn prescribed by her primary care doctor but it didn't help so she stopped taking it.  Pain is located to the epigastrium associated with nausea and vomiting.  Endorsing that she vomited 3 times yesterday evening upon arrival to the hospital and not prior.  Emesis is described as low-volume brown-tan in color.  Denying any bloody or black vomit.  In regards to Tylenol ingestion patient endorses a history of worsening tooth infection/tooth pain since coming back from vacation noting she was in the [facility].  She endorses in the treatment of the tooth pain she has been taken two 500 mg capsules (1 g) of Tylenol every 2 hours for approximately the past 2 to 4 weeks, but she is unclear on the amount specifically. Denies any thoughts of harming self just was attempting to treat pain.      Past Medical History  She has no past medical history on file.    Surgical History  She has no past surgical history on file.     Social History  She reports that she has never smoked. She has never used smokeless tobacco. No history on file for alcohol use and drug use.    Family History  No family history on file.     Allergies  Patient  has no allergy information on record.    Review of Systems  .ros A 10 point ROS was performed with the patient denying any complaint at this time aside from those listed in the HPI above        Physical Exam  Constitutional: Well developed, obese female, no distress, alert and cooperative.  Eyes: EOMI, clear sclera  Head/Neck: Normocephalic, atraumatic without lesions  Respiratory/Thorax: Airways CTA bilaterally, no accessory muscle use  Cardiovascular: Regular rhythm, no murmurs, 2+ equal pulses of the extremities, normal S1 and S2  Gastrointestinal: Nondistended, soft, non-tender, no rebound tenderness or guarding, +BS in all four quadrants  Musculoskeletal: Normal and Equal strength  Extremities: No edema  Neurological: alert and oriented x3, intact senses, motor  Psychological: Appropriate mood and behavior  Skin: Warm and dry, No Rashes         Last Recorded Vitals  /88 (BP Location: Right arm, Patient Position: Lying)   Pulse 70   Temp 36.1 °C (97 °F) (Temporal)   Resp 17   Wt 138 kg (303 lb 5.7 oz)   SpO2 94%     Relevant Results  Scheduled medications  acetylcysteine, 10,000 mg, intravenous, Once  famotidine, 20 mg, intravenous, q12h TANIA  heparin (porcine), 7,500 Units, subcutaneous, q8h TANIA      Continuous medications  lactated Ringer's, 100 mL/hr, Last Rate: 100 mL/hr (12/17/24 0938)      PRN medications  PRN medications: prochlorperazine  Results for orders placed or performed during the hospital encounter of 12/16/24 (from the past 24 hours)   CBC and Auto Differential   Result Value Ref Range    WBC 7.5 4.4 - 11.3 x10*3/uL    nRBC 0.0 0.0 - 0.0 /100 WBCs    RBC 4.29 4.00 - 5.20 x10*6/uL    Hemoglobin 13.2 12.0 - 16.0 g/dL    Hematocrit 39.4 36.0 - 46.0 %    MCV 92 80 - 100 fL    MCH 30.8 26.0 - 34.0 pg    MCHC 33.5 32.0 - 36.0 g/dL    RDW 14.3 11.5 - 14.5 %    Platelets 232 150 - 450 x10*3/uL    Neutrophils % 63.7 40.0 - 80.0 %    Immature Granulocytes %, Automated 0.5 0.0 - 0.9 %     Lymphocytes % 23.5 13.0 - 44.0 %    Monocytes % 6.0 2.0 - 10.0 %    Eosinophils % 5.2 0.0 - 6.0 %    Basophils % 1.1 0.0 - 2.0 %    Neutrophils Absolute 4.78 1.20 - 7.70 x10*3/uL    Immature Granulocytes Absolute, Automated 0.04 0.00 - 0.70 x10*3/uL    Lymphocytes Absolute 1.76 1.20 - 4.80 x10*3/uL    Monocytes Absolute 0.45 0.10 - 1.00 x10*3/uL    Eosinophils Absolute 0.39 0.00 - 0.70 x10*3/uL    Basophils Absolute 0.08 0.00 - 0.10 x10*3/uL   Comprehensive metabolic panel   Result Value Ref Range    Glucose 89 74 - 99 mg/dL    Sodium 135 (L) 136 - 145 mmol/L    Potassium 5.5 (H) 3.5 - 5.3 mmol/L    Chloride 102 98 - 107 mmol/L    Bicarbonate 28 21 - 32 mmol/L    Anion Gap 11 10 - 20 mmol/L    Urea Nitrogen 6 6 - 23 mg/dL    Creatinine 0.64 0.50 - 1.05 mg/dL    eGFR >90 >60 mL/min/1.73m*2    Calcium 9.1 8.6 - 10.3 mg/dL    Albumin 4.0 3.4 - 5.0 g/dL    Alkaline Phosphatase 194 (H) 33 - 110 U/L    Total Protein 7.7 6.4 - 8.2 g/dL     (H) 9 - 39 U/L    Bilirubin, Total 1.0 0.0 - 1.2 mg/dL     (H) 7 - 45 U/L   Lipase   Result Value Ref Range    Lipase 30 9 - 82 U/L   B-Type Natriuretic Peptide   Result Value Ref Range    BNP 14 0 - 99 pg/mL   Troponin I, High Sensitivity, Initial   Result Value Ref Range    Troponin I, High Sensitivity 3 0 - 13 ng/L   Coagulation Screen   Result Value Ref Range    Protime 12.3 9.8 - 12.8 seconds    INR 1.1 0.9 - 1.1    aPTT 32 27 - 38 seconds   Troponin, High Sensitivity, 1 Hour   Result Value Ref Range    Troponin I, High Sensitivity 3 0 - 13 ng/L   Comprehensive metabolic panel   Result Value Ref Range    Glucose 84 74 - 99 mg/dL    Sodium 136 136 - 145 mmol/L    Potassium 3.7 3.5 - 5.3 mmol/L    Chloride 104 98 - 107 mmol/L    Bicarbonate 23 21 - 32 mmol/L    Anion Gap 13 10 - 20 mmol/L    Urea Nitrogen 6 6 - 23 mg/dL    Creatinine 0.55 0.50 - 1.05 mg/dL    eGFR >90 >60 mL/min/1.73m*2    Calcium 9.1 8.6 - 10.3 mg/dL    Albumin 3.9 3.4 - 5.0 g/dL    Alkaline Phosphatase  188 (H) 33 - 110 U/L    Total Protein 7.4 6.4 - 8.2 g/dL     (H) 9 - 39 U/L    Bilirubin, Total 1.2 0.0 - 1.2 mg/dL     (H) 7 - 45 U/L   Lavender Top   Result Value Ref Range    Extra Tube Hold for add-ons.    SST TOP   Result Value Ref Range    Extra Tube Hold for add-ons.    Acetaminophen level   Result Value Ref Range    Acetaminophen <10.0 10.0 - 30.0 ug/mL   Hepatitis panel, acute   Result Value Ref Range    Hepatitis B Surface AG Nonreactive Nonreactive    Hepatitis A  AB- IgM Nonreactive Nonreactive    Hepatitis B Core AB; IgM Nonreactive Nonreactive    Hepatitis C AB Nonreactive Nonreactive   CBC   Result Value Ref Range    WBC 6.5 4.4 - 11.3 x10*3/uL    nRBC 0.0 0.0 - 0.0 /100 WBCs    RBC 4.26 4.00 - 5.20 x10*6/uL    Hemoglobin 12.8 12.0 - 16.0 g/dL    Hematocrit 39.6 36.0 - 46.0 %    MCV 93 80 - 100 fL    MCH 30.0 26.0 - 34.0 pg    MCHC 32.3 32.0 - 36.0 g/dL    RDW 13.5 11.5 - 14.5 %    Platelets 192 150 - 450 x10*3/uL   Comprehensive Metabolic Panel   Result Value Ref Range    Glucose 116 (H) 74 - 99 mg/dL    Sodium 136 136 - 145 mmol/L    Potassium 3.9 3.5 - 5.3 mmol/L    Chloride 102 98 - 107 mmol/L    Bicarbonate 25 21 - 32 mmol/L    Anion Gap 13 10 - 20 mmol/L    Urea Nitrogen 6 6 - 23 mg/dL    Creatinine 0.52 0.50 - 1.05 mg/dL    eGFR >90 >60 mL/min/1.73m*2    Calcium 9.0 8.6 - 10.3 mg/dL    Albumin 3.6 3.4 - 5.0 g/dL    Alkaline Phosphatase 175 (H) 33 - 110 U/L    Total Protein 6.9 6.4 - 8.2 g/dL     (H) 9 - 39 U/L    Bilirubin, Total 1.2 0.0 - 1.2 mg/dL     (H) 7 - 45 U/L   Protime-INR   Result Value Ref Range    Protime 14.1 (H) 9.8 - 12.8 seconds    INR 1.3 (H) 0.9 - 1.1   Magnesium   Result Value Ref Range    Magnesium 2.08 1.60 - 2.40 mg/dL     US right upper quadrant  Result Date: 12/16/2024  Interpreted By:  Finkelstein, Evan, STUDY: US RIGHT UPPER QUADRANT;  12/16/2024 9:33 pm   INDICATION: Signs/Symptoms:acutely elevated lfts.   COMPARISON: CT abdomen pelvis  12/16/2024   ACCESSION NUMBER(S): [id number]   ORDERING CLINICIAN: PRISCILLA ROWE   TECHNIQUE: Grayscale and color Doppler sonographic imaging of the right upper quadrant.   FINDINGS: Evaluation is limited due to overlying bowel gas and limitations related to patient's body habitus. Additionally, patient was unable to lie flat for the examination.   LIVER: The right lobe of the liver was not well visualized. Imaged portions of the liver demonstrate homogeneous echogenicity.   BILE DUCTS: No intrahepatic or extrahepatic bile duct dilatation. Common Bile Duct = not visualized.   GALLBLADDER: Not visualized.   PANCREAS: Not well visualized due to overlying bowel gas.   RIGHT KIDNEY: 10.3 cm. Normal echogenicity. No hydronephrosis. Simple appearing cyst along the inferior aspect of the right kidney measuring up to 1.7 cm.   PERITONEUM: No upper abdominal ascites.       No acute sonographic abnormality allowing for limitations above.   MACRO: None.   Signed by: Evan Finkelstein 12/16/2024 10:02 PM Dictation workstation:   [id number]    XR chest 1 view  Result Date: 12/16/2024  STUDY: Chest Radiograph;  12/16/2024 7:06PM INDICATION: Evaluate for mediastinal widening. No signs, symptoms or diagnoses provided. No trauma history provided. COMPARISON: None available. ACCESSION NUMBER(S): [id number] ORDERING CLINICIAN: PRISCILLA ROWE TECHNIQUE:  Frontal chest was obtained at 1905 hours. The technologist provides no signs, symptoms or diagnoses. FINDINGS: The study was performed to assess for mediastinal widening.  The mediastinum, at the level of the aortic arch, measures 7.7 cm.  This study was performed as an AP examination.  Please note that an AP examination can artificially widen the appearance of the mediastinum. Typically, widening of the mediastinum is regarded as 8 cm or greater in width on the PA projection.  Given that the measurement is 7.7 cm on this AP exam, true mediastinal widening is not suspected.  If  there is clinical concern for abnormality of the mediastinum or the great vessels, consider further evaluation with chest CT. No acute pulmonary consolidation.  No pleural effusion.  No pneumothorax.  No acute bony lesion. There are degenerative changes of the spine.  Multiple wires overlie the chest. Signed by Marcello Osborne MD    CT angio chest abdomen pelvis  Result Date: 12/16/2024  1. No thoracic or abdominal aortic aneurysm or acute aortic pathology. 2. No acute abnormality of the chest, abdomen or pelvis. 3. Liver is borderline enlarged, measuring up to 20 cm in craniocaudal dimension, and demonstrates diffusely decreased attenuation suggestive of fatty infiltration. Correlate with serum LFTs.   MACRO: None.   Signed by: Kenyatta Pichardo 12/16/2024 7:16 PM Dictation workstation:   [id number]        Assessment/Plan     54-year-old female presenting with complaint of epigastric pain with endorse history of high Tylenol use, found to have transaminitis concern for Tylenol toxicity initiated on NAC.  PMHx significant for NAFLD.     #Tylenol misuse concern for toxicity   #Transaminitis  #NAFLD    Plan:   -Physical exam negative for encephalopathy   -RUQ ultrasound did not visualize gallbladder, noting CBD not seen  -Possible MRCP tomorrow, concern of transient choledocholithiasis but will hold off for now anticipate downtrend in LFTs  -Continue NAC in the treatment of suspected Tylenol toxicity  -Low suspicion for tylenol toxicity, noting APAP level less than 10  -Continue to trend daily LFTs, bilirubin and INR    -Low suspicion for antibiotic (Augmentin/amoxicillin) induced liver injury pattern not consistent      Assessment plan discussed with attending physician, Dr. Chan    Document created with voice recognition software please excuse grammatical error/misspellings    Best Vick,   PGY3, internal medicine [facility]

## 2024-12-18 LAB
ALBUMIN SERPL BCP-MCNC: 3.4 G/DL (ref 3.4–5)
ALP SERPL-CCNC: 192 U/L (ref 33–110)
ALT SERPL W P-5'-P-CCNC: 682 U/L (ref 7–45)
ANION GAP SERPL CALC-SCNC: 10 MMOL/L (ref 10–20)
AST SERPL W P-5'-P-CCNC: 373 U/L (ref 9–39)
BILIRUB SERPL-MCNC: 1.3 MG/DL (ref 0–1.2)
BUN SERPL-MCNC: 9 MG/DL (ref 6–23)
CALCIUM SERPL-MCNC: 8.7 MG/DL (ref 8.6–10.3)
CHLORIDE SERPL-SCNC: 102 MMOL/L (ref 98–107)
CO2 SERPL-SCNC: 28 MMOL/L (ref 21–32)
CREAT SERPL-MCNC: 0.65 MG/DL (ref 0.5–1.05)
EGFRCR SERPLBLD CKD-EPI 2021: >90 ML/MIN/1.73M*2
ERYTHROCYTE [DISTWIDTH] IN BLOOD BY AUTOMATED COUNT: 13.7 % (ref 11.5–14.5)
GLUCOSE BLD MANUAL STRIP-MCNC: 90 MG/DL (ref 74–99)
GLUCOSE SERPL-MCNC: 109 MG/DL (ref 74–99)
HCT VFR BLD AUTO: 37.5 % (ref 36–46)
HGB BLD-MCNC: 11.9 G/DL (ref 12–16)
MAGNESIUM SERPL-MCNC: 2.02 MG/DL (ref 1.6–2.4)
MCH RBC QN AUTO: 29.8 PG (ref 26–34)
MCHC RBC AUTO-ENTMCNC: 31.7 G/DL (ref 32–36)
MCV RBC AUTO: 94 FL (ref 80–100)
NRBC BLD-RTO: 0 /100 WBCS (ref 0–0)
PLATELET # BLD AUTO: 176 X10*3/UL (ref 150–450)
POTASSIUM SERPL-SCNC: 3.9 MMOL/L (ref 3.5–5.3)
PROT SERPL-MCNC: 6.4 G/DL (ref 6.4–8.2)
RBC # BLD AUTO: 3.99 X10*6/UL (ref 4–5.2)
SODIUM SERPL-SCNC: 136 MMOL/L (ref 136–145)
WBC # BLD AUTO: 5.3 X10*3/UL (ref 4.4–11.3)

## 2024-12-18 PROCEDURE — 82947 ASSAY GLUCOSE BLOOD QUANT: CPT

## 2024-12-18 PROCEDURE — 83735 ASSAY OF MAGNESIUM: CPT

## 2024-12-18 PROCEDURE — 85027 COMPLETE CBC AUTOMATED: CPT

## 2024-12-18 PROCEDURE — 2500000004 HC RX 250 GENERAL PHARMACY W/ HCPCS (ALT 636 FOR OP/ED): Performed by: INTERNAL MEDICINE

## 2024-12-18 PROCEDURE — 99232 SBSQ HOSP IP/OBS MODERATE 35: CPT | Performed by: NURSE PRACTITIONER

## 2024-12-18 PROCEDURE — 1200000002 HC GENERAL ROOM WITH TELEMETRY DAILY

## 2024-12-18 PROCEDURE — 99233 SBSQ HOSP IP/OBS HIGH 50: CPT | Performed by: INTERNAL MEDICINE

## 2024-12-18 PROCEDURE — 2500000004 HC RX 250 GENERAL PHARMACY W/ HCPCS (ALT 636 FOR OP/ED)

## 2024-12-18 PROCEDURE — 80053 COMPREHEN METABOLIC PANEL: CPT

## 2024-12-18 PROCEDURE — 2500000001 HC RX 250 WO HCPCS SELF ADMINISTERED DRUGS (ALT 637 FOR MEDICARE OP): Performed by: INTERNAL MEDICINE

## 2024-12-18 PROCEDURE — 36415 COLL VENOUS BLD VENIPUNCTURE: CPT

## 2024-12-18 RX ORDER — KETOROLAC TROMETHAMINE 15 MG/ML
15 INJECTION, SOLUTION INTRAMUSCULAR; INTRAVENOUS ONCE
Status: COMPLETED | OUTPATIENT
Start: 2024-12-18 | End: 2024-12-18

## 2024-12-18 RX ORDER — OXYCODONE HYDROCHLORIDE 5 MG/1
5 TABLET ORAL EVERY 6 HOURS PRN
Status: DISCONTINUED | OUTPATIENT
Start: 2024-12-18 | End: 2024-12-18

## 2024-12-18 RX ORDER — OXYCODONE HYDROCHLORIDE 5 MG/1
5 TABLET ORAL EVERY 6 HOURS PRN
Status: DISCONTINUED | OUTPATIENT
Start: 2024-12-18 | End: 2024-12-22 | Stop reason: HOSPADM

## 2024-12-18 RX ORDER — OXYCODONE HYDROCHLORIDE 5 MG/1
2.5 TABLET ORAL EVERY 6 HOURS PRN
Status: DISCONTINUED | OUTPATIENT
Start: 2024-12-18 | End: 2024-12-22 | Stop reason: HOSPADM

## 2024-12-18 RX ADMIN — FAMOTIDINE 20 MG: 10 INJECTION, SOLUTION INTRAVENOUS at 09:54

## 2024-12-18 RX ADMIN — HYDROMORPHONE HYDROCHLORIDE 0.4 MG: 1 INJECTION, SOLUTION INTRAMUSCULAR; INTRAVENOUS; SUBCUTANEOUS at 15:53

## 2024-12-18 RX ADMIN — OXYCODONE HYDROCHLORIDE 5 MG: 5 TABLET ORAL at 13:44

## 2024-12-18 RX ADMIN — ACETYLCYSTEINE 10 G: 200 INJECTION, SOLUTION INTRAVENOUS at 18:37

## 2024-12-18 RX ADMIN — KETOROLAC TROMETHAMINE 15 MG: 15 INJECTION, SOLUTION INTRAMUSCULAR; INTRAVENOUS at 12:03

## 2024-12-18 RX ADMIN — KETOROLAC TROMETHAMINE 15 MG: 15 INJECTION, SOLUTION INTRAMUSCULAR; INTRAVENOUS at 05:18

## 2024-12-18 ASSESSMENT — PAIN - FUNCTIONAL ASSESSMENT
PAIN_FUNCTIONAL_ASSESSMENT: 0-10

## 2024-12-18 ASSESSMENT — COGNITIVE AND FUNCTIONAL STATUS - GENERAL
MOBILITY SCORE: 24
DAILY ACTIVITIY SCORE: 24

## 2024-12-18 ASSESSMENT — PAIN SCALES - GENERAL
PAINLEVEL_OUTOF10: 2
PAINLEVEL_OUTOF10: 9
PAINLEVEL_OUTOF10: 10 - WORST POSSIBLE PAIN
PAINLEVEL_OUTOF10: 6
PAINLEVEL_OUTOF10: 10 - WORST POSSIBLE PAIN
PAINLEVEL_OUTOF10: 0 - NO PAIN
PAINLEVEL_OUTOF10: 2

## 2024-12-18 ASSESSMENT — PAIN DESCRIPTION - LOCATION
LOCATION: HEAD

## 2024-12-18 NOTE — PROGRESS NOTES
Deepthi Wild is a 54 y.o. female on day 1 of admission presenting with Acute hepatitis.      Subjective   Patient was seen and examined at bedside, she was not in a good mood today because of headache. Denies any f/c, n/v, vision changes, chest pain, dyspnea, abdominal pain, changes in BM, or urinary changes.         Objective     Last Recorded Vitals  /79 (BP Location: Right arm, Patient Position: Lying)   Pulse 77   Temp 35.6 °C (96.1 °F) (Temporal)   Resp 16   Wt 138 kg (303 lb 5.7 oz)   SpO2 95%   Intake/Output last 3 Shifts:  No intake or output data in the 24 hours ending 12/18/24 1752    Admission Weight  Weight: 136 kg (300 lb) (12/16/24 2210)    Daily Weight  12/17/24 : 138 kg (303 lb 5.7 oz)    Image Results  Electrocardiogram, 12-lead PRN ACS symptoms  Normal sinus rhythm  Minimal voltage criteria for LVH, may be normal variant  Cannot rule out Anterior infarct , age undetermined  Abnormal ECG  No previous ECGs available      Physical Exam  Constitutional:       General: She is not in acute distress.     Appearance: Normal appearance. She is obese.   HENT:      Head: Normocephalic and atraumatic.      Nose: Nose normal.      Mouth/Throat:      Mouth: Mucous membranes are dry.   Eyes:      Extraocular Movements: Extraocular movements intact.      Conjunctiva/sclera: Conjunctivae normal.      Pupils: Pupils are equal, round, and reactive to light.   Cardiovascular:      Rate and Rhythm: Normal rate and regular rhythm.      Pulses: Normal pulses.      Heart sounds: Normal heart sounds.   Pulmonary:      Effort: Pulmonary effort is normal.      Breath sounds: Normal breath sounds.   Abdominal:      General: Abdomen is flat. Bowel sounds are normal.      Palpations: Abdomen is soft.   Musculoskeletal:         General: Normal range of motion.      Cervical back: Normal range of motion and neck supple.   Skin:     General: Skin is warm and dry.   Neurological:      General: No focal deficit present.       Mental Status: She is alert and oriented to person, place, and time. Mental status is at baseline.   Psychiatric:      Comments: Anxious         Relevant Results    Scheduled medications  acetylcysteine, 10,000 mg, intravenous, Once  heparin (porcine), 7,500 Units, subcutaneous, q8h TANIA  [Held by provider] liraglutide, 1.8 mg, subcutaneous, Daily      Continuous medications     PRN medications  PRN medications: oxyCODONE, oxyCODONE, prochlorperazine                   Assessment/Plan      Assessment & Plan  Acute hepatitis  Suspected DILI  Hx of NAFLD  Hx of fibromyalgia   Hx of GERD  Headache  54 YOF NAFLD, fibromyalgia, chronic back pain presenting to [facility] ED c/o epigastric pain.  Significant for new transaminitis with normal bilirubin, concern for DILI as patient has endorsed daily APAP use for the last 3 to 4 weeks, she also recently returned from the [facility], treated with Augmentin for URI.  Case discussed with poison control who recommended starting NAC for subacute APAP toxicity.  Of note her INR, renal function were WNL.     - Continue NAC protocol.  - Trend LFTs, slightly up trending today.   - Antiemetics   - GI consulted appreciate recommendations  - Follow up with poison control  - Avoid hepatotoxic drugs  - Headache, got Toradol and oxycodone with no improvement, dilaudid given and the patient was examined, no focal deficits, her headache subsided after the dilaudid, she was told if her headache doesn't subside, we will do head, and she was not happy.    - NPO at midnight for possible MRCP or in case endoscopic intervention is needed      IVF: PO   DVT Ppx: Heparin  Diet: regular  Code Status: FULL CODE      Admitted for acute hepatitis, with suspected DILI secondary to subacute APAP toxicity superimposed on history of NAFLD, numbers slightly up trending today,  continue current management.                  Codey Emmanuel, DO

## 2024-12-18 NOTE — PROGRESS NOTES
Pt screen for by HRS for Medicaid eligibility. SW helped pt completed the paperwork and emailed back to HRS.     SENDY Senior

## 2024-12-18 NOTE — CARE PLAN
The patient's goals for the shift include      The clinical goals for the shift include see care plan      Problem: Pain - Adult  Goal: Verbalizes/displays adequate comfort level or baseline comfort level  Outcome: Progressing     Problem: Safety - Adult  Goal: Free from fall injury  Outcome: Progressing     Problem: Discharge Planning  Goal: Discharge to home or other facility with appropriate resources  Outcome: Progressing     Problem: Chronic Conditions and Co-morbidities  Goal: Patient's chronic conditions and co-morbidity symptoms are monitored and maintained or improved  Outcome: Progressing     Problem: Pain  Goal: Takes deep breaths with improved pain control throughout the shift  Outcome: Progressing     Problem: Fall/Injury  Goal: Not fall by end of shift  Outcome: Progressing     EOS note: Pt oriented x4 and pleasant this shift. Pt reported severe headache not relieved by toradol, oxycodone. 1x dose of dilaudid given at 1553 - Dr. Emmanuel saw pt at bedside and said that if pain is not relieved she will need to have a head scan. Pt does not want to have an MRCP due to claustrophobia and would need to be under anesthesia. Plan is for GI to monitor labs and potential EUS tomorrow.    Pt resting at this time. Bed is in lowest position and locked with alarm on. Call light and personal possesions are within reach.      1620 - Around this time, Poison control called RN. Spoke to Michelle. She stated that Acetylcysteine should be continued until the ALT/AST are half of what the peak were. Pt is still not at this point, messaged Dr. Emmanuel about this who ordered additional bag. Questions can be directed by calling them.

## 2024-12-18 NOTE — NURSING NOTE
Patient with no major issues this shift. IVF still running. Patient having intermittent abdominal pain/cramping and occasional headache. PRN medicine given.

## 2024-12-18 NOTE — PROGRESS NOTES
12/18/24 1417   Discharge Planning   Living Arrangements Spouse/significant other   Support Systems Spouse/significant other   Type of Residence Private residence   Home or Post Acute Services None   Expected Discharge Disposition Home   Does the patient need discharge transport arranged? No   Stroke Family Assessment   Stroke Family Assessment Needed No   Intensity of Service   Intensity of Service 0-30 min     Met with pt to review her dc plan. The pt plans to return home. States she is independent with no needs. PCP is Dr. Vora and prescriptions are [facility]. Has a ride home at time of dc.

## 2024-12-18 NOTE — SIGNIFICANT EVENT
Updated A&P  Patient seen and examined at bedside, no acute distress, epigastric pain resolved, wants to get her Victoza.   Suspected DILI  Hx of NAFLD  Hx of fibromyalgia   Hx of GERD     54 YOF NAFLD, fibromyalgia, chronic back pain presenting to [facility] ED c/o epigastric pain.  Significant for new transaminitis with normal bilirubin, concern for DILI as patient has endorsed daily APAP use for the last 3 to 4 weeks, she also recently returned from the [facility], treated with Augmentin for URI.  Case discussed with poison control who recommended starting NAC for subacute APAP toxicity.  Of note her INR, renal function were WNL.     - Continue NAC protocol.  - Trend LFTs, down trending.   - Antiemetics   - GI consulted appreciate recommendations  - Follow up with poison control  - Avoid hepatotoxic drugs     IVF: PO   DVT Ppx: Heparin  Diet: regular  Code Status: FULL CODE      Admitted for acute hepatitis, with suspected DILI secondary to subacute APAP toxicity superimposed on history of NAFLD, patient is improving, continue current management.       12/17/24 at 8:31 PM - Codey Emmanuel DO

## 2024-12-18 NOTE — PROGRESS NOTES
Spiritual Care Visit  Spiritual Care Request    Reason for Visit:        Request Received From:       Focus of Care:            Refer to :          Spiritual Care Assessment    Spiritual Assessment:                      Care Provided:       Sense of Community and or Quaker Affiliation:  Lutheran         Addressed Needs/Concerns and/or Jadyn Through:          Outcome:        Advance Directives:         Spiritual Care Annotation    Annotation:  Patient was with her .  Patient used to go to a [employer] in [place].   prayed at her request.

## 2024-12-18 NOTE — PROGRESS NOTES
"Subjective  Patient sitting up in bed with mild lower abd TTP. No nausea or vomiting. Minimal requirements for pan relief.  Liver enzymes up trended today.  INR 1.3.  Patient A&O x3 with no change in mentation.    Objective  Blood pressure 117/79, pulse 69, temperature 36.3 °C (97.3 °F), temperature source Temporal, resp. rate 16, height 1.727 m (5' 8\"), weight 138 kg (303 lb 5.7 oz), SpO2 96%.    Physical Exam  Constitutional: Alert, pleasant and interactive, in NAD  Eyes: PERRL, sclera clear, no conjunctival injection  Skin: Warm and dry, no rash or ecchymosis  ENMT: Mucous membranes moist, no lesions noted  Resp: CTAB, even and unlabored  CV: RRR, normal S1, S2, no m,r,g  GI: +BS, soft, round, lower abd TTP, no rebound tenderness or guarding, no palpable masses or organomegaly  Extremities: Extremities warm, no edema, contusions, wounds or cyanosis  Neuro: Alert and oriented x3  Psych: Appropriate mood and behavior    Medications  Scheduled medications  heparin (porcine), 7,500 Units, subcutaneous, q8h TANIA  [Held by provider] liraglutide, 1.8 mg, subcutaneous, Daily      Continuous medications     PRN medications  PRN medications: prochlorperazine     Labs  Lab Results   Component Value Date    WBC 5.3 12/18/2024    HGB 11.9 (L) 12/18/2024    HCT 37.5 12/18/2024    MCV 94 12/18/2024     12/18/2024     Lab Results   Component Value Date    GLUCOSE 109 (H) 12/18/2024    CALCIUM 8.7 12/18/2024     12/18/2024    K 3.9 12/18/2024    CO2 28 12/18/2024     12/18/2024    BUN 9 12/18/2024    CREATININE 0.65 12/18/2024     Lab Results   Component Value Date     (H) 12/18/2024     (H) 12/18/2024    ALKPHOS 192 (H) 12/18/2024    BILITOT 1.3 (H) 12/18/2024     No results found for: \"IRON\", \"TIBC\", \"FERRITIN\"  Lab Results   Component Value Date    INR 1.3 (H) 12/17/2024    INR 1.1 12/16/2024    PROTIME 14.1 (H) 12/17/2024    PROTIME 12.3 12/16/2024       Radiology  RUQ US 12/16/2024 " noting:  Impression:     No acute sonographic abnormality allowing for limitations above.    Signed by: Evan Finkelstein 12/16/2024 10:02 PM  Dictation workstation:   [id number]     CT angio chest A/P 12/16/2024 noting:  Impression:     1. No thoracic or abdominal aortic aneurysm or acute aortic pathology.  2. No acute abnormality of the chest, abdomen or pelvis.  3. Liver is borderline enlarged, measuring up to 20 cm in  craniocaudal dimension, and demonstrates diffusely decreased  attenuation suggestive of fatty infiltration. Correlate with serum  LFTs.    Signed by: Kenyatta Pichardo 12/16/2024 7:16 PM  Dictation workstation:   [id number]     Assessment  Deepthi Wild is a 54 y.o. female with PMH of hepatic steatosis presenting with complaint of epigastric pain with recent Tylenol use to ES Tylenol-every 2 hours for a toothache, found to have transaminitis, concern for Tylenol toxicity initiated on NAC.  No regular alcohol use.  No acute abdominal findings or bilary dilation on CT or US.  Patient unable to tolerate MRCP due to claustrophobia. Pt completed treatment lang N-AC.     # acute transaminitis  # abd pain  # N/V- resolved  # recent excess Tylenol ingestion  # hepatic steatosis    Plan:  - continue supportive care  - diet as tolerated today  - NPO after MN in case endoscopic intervention is needed  - continue to trend liver enzymes, INR daily  - NO NSAIDS  - continue analgesics and antiemetics as needed  - if pain persists, and/or liver enzymes increase will plan for EUS tomorrow    Plan has been discussed with Dr. Chan. GI will continue to follow.     Arabella Morelos, APRN/CNP

## 2024-12-19 ENCOUNTER — APPOINTMENT (OUTPATIENT)
Dept: RADIOLOGY | Facility: HOSPITAL | Age: 54
End: 2024-12-19

## 2024-12-19 LAB
ALBUMIN SERPL BCP-MCNC: 3.5 G/DL (ref 3.4–5)
ALP SERPL-CCNC: 190 U/L (ref 33–110)
ALT SERPL W P-5'-P-CCNC: 566 U/L (ref 7–45)
ANION GAP SERPL CALC-SCNC: 12 MMOL/L (ref 10–20)
AST SERPL W P-5'-P-CCNC: 237 U/L (ref 9–39)
ATRIAL RATE: 83 BPM
BILIRUB SERPL-MCNC: 1.8 MG/DL (ref 0–1.2)
BUN SERPL-MCNC: 7 MG/DL (ref 6–23)
CALCIUM SERPL-MCNC: 9 MG/DL (ref 8.6–10.3)
CHLORIDE SERPL-SCNC: 104 MMOL/L (ref 98–107)
CO2 SERPL-SCNC: 25 MMOL/L (ref 21–32)
CREAT SERPL-MCNC: 0.5 MG/DL (ref 0.5–1.05)
EGFRCR SERPLBLD CKD-EPI 2021: >90 ML/MIN/1.73M*2
ERYTHROCYTE [DISTWIDTH] IN BLOOD BY AUTOMATED COUNT: 13.8 % (ref 11.5–14.5)
GLUCOSE BLD MANUAL STRIP-MCNC: 96 MG/DL (ref 74–99)
GLUCOSE SERPL-MCNC: 115 MG/DL (ref 74–99)
HCT VFR BLD AUTO: 39.1 % (ref 36–46)
HGB BLD-MCNC: 12.7 G/DL (ref 12–16)
INR PPP: 1.1 (ref 0.9–1.1)
INR PPP: 1.1 (ref 0.9–1.1)
MAGNESIUM SERPL-MCNC: 1.95 MG/DL (ref 1.6–2.4)
MCH RBC QN AUTO: 30.2 PG (ref 26–34)
MCHC RBC AUTO-ENTMCNC: 32.5 G/DL (ref 32–36)
MCV RBC AUTO: 93 FL (ref 80–100)
NRBC BLD-RTO: 0 /100 WBCS (ref 0–0)
P AXIS: 20 DEGREES
P OFFSET: 193 MS
P ONSET: 126 MS
PLATELET # BLD AUTO: 172 X10*3/UL (ref 150–450)
POTASSIUM SERPL-SCNC: 3.9 MMOL/L (ref 3.5–5.3)
PR INTERVAL: 184 MS
PROT SERPL-MCNC: 6.7 G/DL (ref 6.4–8.2)
PROTHROMBIN TIME: 12.5 SECONDS (ref 9.8–12.8)
PROTHROMBIN TIME: 12.9 SECONDS (ref 9.8–12.8)
Q ONSET: 218 MS
QRS COUNT: 14 BEATS
QRS DURATION: 88 MS
QT INTERVAL: 348 MS
QTC CALCULATION(BAZETT): 408 MS
QTC FREDERICIA: 388 MS
R AXIS: 8 DEGREES
RBC # BLD AUTO: 4.2 X10*6/UL (ref 4–5.2)
SODIUM SERPL-SCNC: 137 MMOL/L (ref 136–145)
T AXIS: -1 DEGREES
T OFFSET: 392 MS
VENTRICULAR RATE: 83 BPM
WBC # BLD AUTO: 5.9 X10*3/UL (ref 4.4–11.3)

## 2024-12-19 PROCEDURE — 72170 X-RAY EXAM OF PELVIS: CPT | Performed by: RADIOLOGY

## 2024-12-19 PROCEDURE — 85027 COMPLETE CBC AUTOMATED: CPT

## 2024-12-19 PROCEDURE — 2500000004 HC RX 250 GENERAL PHARMACY W/ HCPCS (ALT 636 FOR OP/ED)

## 2024-12-19 PROCEDURE — 83735 ASSAY OF MAGNESIUM: CPT

## 2024-12-19 PROCEDURE — 82947 ASSAY GLUCOSE BLOOD QUANT: CPT

## 2024-12-19 PROCEDURE — 2500000004 HC RX 250 GENERAL PHARMACY W/ HCPCS (ALT 636 FOR OP/ED): Performed by: INTERNAL MEDICINE

## 2024-12-19 PROCEDURE — 36415 COLL VENOUS BLD VENIPUNCTURE: CPT

## 2024-12-19 PROCEDURE — 99232 SBSQ HOSP IP/OBS MODERATE 35: CPT | Performed by: NURSE PRACTITIONER

## 2024-12-19 PROCEDURE — 72170 X-RAY EXAM OF PELVIS: CPT

## 2024-12-19 PROCEDURE — 1200000002 HC GENERAL ROOM WITH TELEMETRY DAILY

## 2024-12-19 PROCEDURE — 36415 COLL VENOUS BLD VENIPUNCTURE: CPT | Performed by: INTERNAL MEDICINE

## 2024-12-19 PROCEDURE — 85610 PROTHROMBIN TIME: CPT | Performed by: INTERNAL MEDICINE

## 2024-12-19 PROCEDURE — BF35ZZZ MAGNETIC RESONANCE IMAGING (MRI) OF LIVER: ICD-10-PCS | Performed by: INTERNAL MEDICINE

## 2024-12-19 PROCEDURE — 99233 SBSQ HOSP IP/OBS HIGH 50: CPT | Performed by: INTERNAL MEDICINE

## 2024-12-19 PROCEDURE — 85610 PROTHROMBIN TIME: CPT

## 2024-12-19 PROCEDURE — 84075 ASSAY ALKALINE PHOSPHATASE: CPT

## 2024-12-19 RX ORDER — LIDOCAINE HYDROCHLORIDE 10 MG/ML
5 INJECTION, SOLUTION EPIDURAL; INFILTRATION; INTRACAUDAL; PERINEURAL ONCE
Status: DISCONTINUED | OUTPATIENT
Start: 2024-12-19 | End: 2024-12-22 | Stop reason: HOSPADM

## 2024-12-19 RX ADMIN — HEPARIN SODIUM 7500 UNITS: 5000 INJECTION, SOLUTION INTRAVENOUS; SUBCUTANEOUS at 13:31

## 2024-12-19 RX ADMIN — ACETYLCYSTEINE 10 G: 200 INJECTION, SOLUTION INTRAVENOUS at 13:30

## 2024-12-19 RX ADMIN — HEPARIN SODIUM 7500 UNITS: 5000 INJECTION, SOLUTION INTRAVENOUS; SUBCUTANEOUS at 22:23

## 2024-12-19 ASSESSMENT — COGNITIVE AND FUNCTIONAL STATUS - GENERAL
DAILY ACTIVITIY SCORE: 24
MOBILITY SCORE: 24

## 2024-12-19 ASSESSMENT — PAIN - FUNCTIONAL ASSESSMENT
PAIN_FUNCTIONAL_ASSESSMENT: 0-10
PAIN_FUNCTIONAL_ASSESSMENT: 0-10

## 2024-12-19 ASSESSMENT — PAIN SCALES - GENERAL
PAINLEVEL_OUTOF10: 3
PAINLEVEL_OUTOF10: 0 - NO PAIN

## 2024-12-19 ASSESSMENT — PAIN DESCRIPTION - DESCRIPTORS: DESCRIPTORS: CRAMPING

## 2024-12-19 NOTE — NURSING NOTE
1100: Pt IV started leaking at site during infusion of Acetadote and began causing pt pain. Pt requested for IV to be removed. Infusion stopped. SHIVA Rasmussen LPN attempted to insert new IV but was unsuccessful. Call out to IR and ICU for request for US IV start, awaiting availability at this time.     1315: ER RN at bedside to place US IV at this time.      1330: Acetadote infusion restarted at this time through new 20g IV in RFA.    1852: Pt called and noticed that right forearm was swollen near IV site. This RN found that IV infiltrated while Acetadote infusion was running. Infusion stopped and IV removed. Pharmacy called and was instructed that no antidote is needed for this medication. Instructed by pharmacist to monitor site, keep extremity elevated and apply a warm/cold compress. Pt educated on these instructions at this time.     EOS note: No acute changes this shift. Remains Aox4, room air, on tele- NSR, independent in room (refusing alarms, moderate falls risk). Plan for pt to have MRCP completed tomorrow under anesthesia at 1400, NPO at midnight. Pt continued on Acetadote infusion this shift. Pt had 2 PIVs go bad this shift, one that infiltrated in RFA while Acetadone infusion was running. Site swollen and monitored, no need for antidote per pharmacy. Pt resting comfortably in bed with call light within reach at this time.

## 2024-12-19 NOTE — NURSING NOTE
Patient with no major changes this shift. Patient did not need PRN pain medications. Patient  at bedside throughout night.

## 2024-12-19 NOTE — PROGRESS NOTES
"Subjective  Patient sitting up in bed with mild lower abdominal cramping and epigastric discomfort. No nausea or vomiting. Liver enzymes downtrending, bilirubin up slightly to 1.8. INR 1.1. Patient remains A&O x3 with no change in mentation.  Plan for MRCP with anesthesia.    Objective  Blood pressure 146/84, pulse 74, temperature 36.4 °C (97.5 °F), temperature source Temporal, resp. rate 16, height 1.727 m (5' 8\"), weight 138 kg (303 lb 5.7 oz), SpO2 96%.    Physical Exam  Constitutional: Alert, pleasant and interactive, in NAD  Eyes: PERRL, sclera clear, no conjunctival injection  Skin: Warm and dry, no rash or ecchymosis  ENMT: Mucous membranes moist, no lesions noted  Resp: CTAB, even and unlabored  CV: RRR, normal S1, S2, no m,r,g  GI: +BS, soft, round, lower abd and epigastric TTP, no rebound tenderness or guarding, no palpable masses or organomegaly  Extremities: Extremities warm, no edema, contusions, wounds or cyanosis  Neuro: Alert and oriented x3  Psych: Appropriate mood and behavior    Medications  Scheduled medications  acetylcysteine, 10,000 mg, intravenous, Once  heparin (porcine), 7,500 Units, subcutaneous, q8h TANIA  lidocaine, 5 mL, infiltration, Once  [Held by provider] liraglutide, 1.8 mg, subcutaneous, Daily      Continuous medications     PRN medications  PRN medications: oxyCODONE, oxyCODONE, prochlorperazine     Labs  Lab Results   Component Value Date    WBC 5.9 12/19/2024    HGB 12.7 12/19/2024    HCT 39.1 12/19/2024    MCV 93 12/19/2024     12/19/2024     Lab Results   Component Value Date    GLUCOSE 115 (H) 12/19/2024    CALCIUM 9.0 12/19/2024     12/19/2024    K 3.9 12/19/2024    CO2 25 12/19/2024     12/19/2024    BUN 7 12/19/2024    CREATININE 0.50 12/19/2024     Lab Results   Component Value Date     (H) 12/19/2024     (H) 12/19/2024    ALKPHOS 190 (H) 12/19/2024    BILITOT 1.8 (H) 12/19/2024     No results found for: \"IRON\", \"TIBC\", \"FERRITIN\"  Lab " Results   Component Value Date    INR 1.1 12/19/2024    INR 1.1 12/19/2024    INR 1.3 (H) 12/17/2024    PROTIME 12.5 12/19/2024    PROTIME 12.9 (H) 12/19/2024    PROTIME 14.1 (H) 12/17/2024       Radiology  RUQ US 12/16/2024 noting:  Impression:     No acute sonographic abnormality allowing for limitations above.    Signed by: Evan Finkelstein 12/16/2024 10:02 PM  Dictation workstation:   [id number]     CT angio chest A/P 12/16/2024 noting:  Impression:     1. No thoracic or abdominal aortic aneurysm or acute aortic pathology.  2. No acute abnormality of the chest, abdomen or pelvis.  3. Liver is borderline enlarged, measuring up to 20 cm in  craniocaudal dimension, and demonstrates diffusely decreased  attenuation suggestive of fatty infiltration. Correlate with serum  LFTs.    Signed by: Kenyatta Pichardo 12/16/2024 7:16 PM  Dictation workstation:   [id number]     Assessment  Deepthi Wild is a 54 y.o. female with PMH of hepatic steatosis presenting with complaint of epigastric pain with recent Tylenol use to ES Tylenol-every 2 hours for a toothache, found to have transaminitis, concern for Tylenol toxicity initiated on NAC.  No regular alcohol use.  No acute abdominal findings or bilary dilation on CT or US.  Patient unable to tolerate MRCP due to claustrophobia. Pt continues N-AC.     Patient sitting up in bed with mild cramping in lower abdomen and mild epigastric discomfort.  No nausea or vomiting.  Liver enzymes downtrending with bilirubin up some to 1.8 today.  INR 1.1. Mental status remains intact with no alterations.    # acute transaminitis  # abd pain- improved  # N/V- resolved  # recent excess Tylenol ingestion  # hepatic steatosis    Plan:  - continue supportive care  - NPO for MRCP  - can have diet if MRCP postponed until 12/20 then NPO for imaging  - NPO after MN in case endoscopic intervention is needed  - continue to trend liver enzymes, INR daily  - continue to monitor mental status  - NO  NSAIDS  - continue analgesics and antiemetics as needed  - continue N-AC per paln/guidelines    Plan has been discussed with Dr. Chan. GI will continue to follow.     Arabella Li, APRN/CNP

## 2024-12-20 ENCOUNTER — ANESTHESIA (OUTPATIENT)
Dept: RADIOLOGY | Facility: HOSPITAL | Age: 54
End: 2024-12-20

## 2024-12-20 ENCOUNTER — ANESTHESIA EVENT (OUTPATIENT)
Dept: RADIOLOGY | Facility: HOSPITAL | Age: 54
End: 2024-12-20

## 2024-12-20 ENCOUNTER — APPOINTMENT (OUTPATIENT)
Dept: RADIOLOGY | Facility: HOSPITAL | Age: 54
End: 2024-12-20

## 2024-12-20 LAB
ALBUMIN SERPL BCP-MCNC: 3.6 G/DL (ref 3.4–5)
ALP SERPL-CCNC: 220 U/L (ref 33–110)
ALT SERPL W P-5'-P-CCNC: 521 U/L (ref 7–45)
ANION GAP SERPL CALC-SCNC: 13 MMOL/L (ref 10–20)
AST SERPL W P-5'-P-CCNC: 205 U/L (ref 9–39)
BILIRUB SERPL-MCNC: 1.8 MG/DL (ref 0–1.2)
BUN SERPL-MCNC: 7 MG/DL (ref 6–23)
CALCIUM SERPL-MCNC: 9.2 MG/DL (ref 8.6–10.3)
CHLORIDE SERPL-SCNC: 105 MMOL/L (ref 98–107)
CO2 SERPL-SCNC: 23 MMOL/L (ref 21–32)
CREAT SERPL-MCNC: 0.42 MG/DL (ref 0.5–1.05)
EGFRCR SERPLBLD CKD-EPI 2021: >90 ML/MIN/1.73M*2
GLUCOSE SERPL-MCNC: 116 MG/DL (ref 74–99)
INR PPP: 1 (ref 0.9–1.1)
POTASSIUM SERPL-SCNC: 4 MMOL/L (ref 3.5–5.3)
PROT SERPL-MCNC: 6.9 G/DL (ref 6.4–8.2)
PROTHROMBIN TIME: 11.4 SECONDS (ref 9.8–12.8)
SODIUM SERPL-SCNC: 137 MMOL/L (ref 136–145)

## 2024-12-20 PROCEDURE — 99233 SBSQ HOSP IP/OBS HIGH 50: CPT | Performed by: STUDENT IN AN ORGANIZED HEALTH CARE EDUCATION/TRAINING PROGRAM

## 2024-12-20 PROCEDURE — 2500000004 HC RX 250 GENERAL PHARMACY W/ HCPCS (ALT 636 FOR OP/ED): Performed by: STUDENT IN AN ORGANIZED HEALTH CARE EDUCATION/TRAINING PROGRAM

## 2024-12-20 PROCEDURE — 80053 COMPREHEN METABOLIC PANEL: CPT | Performed by: INTERNAL MEDICINE

## 2024-12-20 PROCEDURE — 36415 COLL VENOUS BLD VENIPUNCTURE: CPT | Performed by: INTERNAL MEDICINE

## 2024-12-20 PROCEDURE — 36410 VNPNXR 3YR/> PHY/QHP DX/THER: CPT

## 2024-12-20 PROCEDURE — 2500000004 HC RX 250 GENERAL PHARMACY W/ HCPCS (ALT 636 FOR OP/ED)

## 2024-12-20 PROCEDURE — 2500000004 HC RX 250 GENERAL PHARMACY W/ HCPCS (ALT 636 FOR OP/ED): Performed by: NURSE ANESTHETIST, CERTIFIED REGISTERED

## 2024-12-20 PROCEDURE — 2780000003 HC OR 278 NO HCPCS

## 2024-12-20 PROCEDURE — 2550000001 HC RX 255 CONTRASTS: Performed by: STUDENT IN AN ORGANIZED HEALTH CARE EDUCATION/TRAINING PROGRAM

## 2024-12-20 PROCEDURE — 7100000002 HC RECOVERY ROOM TIME - EACH INCREMENTAL 1 MINUTE

## 2024-12-20 PROCEDURE — A9575 INJ GADOTERATE MEGLUMI 0.1ML: HCPCS | Performed by: STUDENT IN AN ORGANIZED HEALTH CARE EDUCATION/TRAINING PROGRAM

## 2024-12-20 PROCEDURE — 74183 MRI ABD W/O CNTR FLWD CNTR: CPT

## 2024-12-20 PROCEDURE — 3700000002 HC GENERAL ANESTHESIA TIME - EACH INCREMENTAL 1 MINUTE

## 2024-12-20 PROCEDURE — 99232 SBSQ HOSP IP/OBS MODERATE 35: CPT | Performed by: NURSE PRACTITIONER

## 2024-12-20 PROCEDURE — 7100000001 HC RECOVERY ROOM TIME - INITIAL BASE CHARGE

## 2024-12-20 PROCEDURE — 3700000001 HC GENERAL ANESTHESIA TIME - INITIAL BASE CHARGE

## 2024-12-20 PROCEDURE — C1751 CATH, INF, PER/CENT/MIDLINE: HCPCS

## 2024-12-20 PROCEDURE — 1200000002 HC GENERAL ROOM WITH TELEMETRY DAILY

## 2024-12-20 PROCEDURE — 85610 PROTHROMBIN TIME: CPT | Performed by: INTERNAL MEDICINE

## 2024-12-20 RX ORDER — SODIUM CHLORIDE, SODIUM LACTATE, POTASSIUM CHLORIDE, CALCIUM CHLORIDE 600; 310; 30; 20 MG/100ML; MG/100ML; MG/100ML; MG/100ML
100 INJECTION, SOLUTION INTRAVENOUS CONTINUOUS
Status: DISCONTINUED | OUTPATIENT
Start: 2024-12-20 | End: 2024-12-20

## 2024-12-20 RX ORDER — ONDANSETRON HYDROCHLORIDE 2 MG/ML
INJECTION, SOLUTION INTRAVENOUS AS NEEDED
Status: SHIPPED | OUTPATIENT
Start: 2024-12-20

## 2024-12-20 RX ORDER — LABETALOL HYDROCHLORIDE 5 MG/ML
5 INJECTION, SOLUTION INTRAVENOUS ONCE AS NEEDED
Status: DISCONTINUED | OUTPATIENT
Start: 2024-12-20 | End: 2024-12-21

## 2024-12-20 RX ORDER — GADOTERATE MEGLUMINE 376.9 MG/ML
28 INJECTION INTRAVENOUS
Status: COMPLETED | OUTPATIENT
Start: 2024-12-20 | End: 2024-12-20

## 2024-12-20 RX ORDER — FENTANYL CITRATE 50 UG/ML
25 INJECTION, SOLUTION INTRAMUSCULAR; INTRAVENOUS EVERY 5 MIN PRN
Status: DISCONTINUED | OUTPATIENT
Start: 2024-12-20 | End: 2024-12-21

## 2024-12-20 RX ORDER — LIDOCAINE HYDROCHLORIDE 10 MG/ML
5 INJECTION, SOLUTION EPIDURAL; INFILTRATION; INTRACAUDAL; PERINEURAL ONCE
Status: DISCONTINUED | OUTPATIENT
Start: 2024-12-20 | End: 2024-12-22 | Stop reason: HOSPADM

## 2024-12-20 RX ORDER — FENTANYL CITRATE 50 UG/ML
INJECTION, SOLUTION INTRAMUSCULAR; INTRAVENOUS AS NEEDED
Status: DISCONTINUED | OUTPATIENT
Start: 2024-12-20 | End: 2024-12-20

## 2024-12-20 RX ORDER — OXYCODONE HYDROCHLORIDE 5 MG/1
5 TABLET ORAL EVERY 4 HOURS PRN
Status: DISCONTINUED | OUTPATIENT
Start: 2024-12-20 | End: 2024-12-21

## 2024-12-20 RX ORDER — MIDAZOLAM HYDROCHLORIDE 1 MG/ML
INJECTION, SOLUTION INTRAMUSCULAR; INTRAVENOUS AS NEEDED
Status: DISCONTINUED | OUTPATIENT
Start: 2024-12-20 | End: 2024-12-20

## 2024-12-20 RX ORDER — MIDAZOLAM HYDROCHLORIDE 1 MG/ML
1 INJECTION, SOLUTION INTRAMUSCULAR; INTRAVENOUS ONCE AS NEEDED
Status: DISCONTINUED | OUTPATIENT
Start: 2024-12-20 | End: 2024-12-21

## 2024-12-20 RX ORDER — MEPERIDINE HYDROCHLORIDE 50 MG/ML
12.5 INJECTION INTRAMUSCULAR; INTRAVENOUS; SUBCUTANEOUS EVERY 10 MIN PRN
Status: DISCONTINUED | OUTPATIENT
Start: 2024-12-20 | End: 2024-12-21

## 2024-12-20 RX ORDER — ONDANSETRON HYDROCHLORIDE 2 MG/ML
4 INJECTION, SOLUTION INTRAVENOUS ONCE AS NEEDED
Status: DISCONTINUED | OUTPATIENT
Start: 2024-12-20 | End: 2024-12-21

## 2024-12-20 RX ORDER — LIDOCAINE HYDROCHLORIDE 5 MG/ML
INJECTION, SOLUTION INFILTRATION; INTRAVENOUS AS NEEDED
Status: DISCONTINUED | OUTPATIENT
Start: 2024-12-20 | End: 2024-12-20

## 2024-12-20 RX ORDER — PROPOFOL 10 MG/ML
INJECTION, EMULSION INTRAVENOUS AS NEEDED
Status: DISCONTINUED | OUTPATIENT
Start: 2024-12-20 | End: 2024-12-20

## 2024-12-20 RX ORDER — ONDANSETRON HYDROCHLORIDE 2 MG/ML
INJECTION, SOLUTION INTRAVENOUS AS NEEDED
Status: DISCONTINUED | OUTPATIENT
Start: 2024-12-20 | End: 2024-12-20

## 2024-12-20 RX ORDER — ALBUTEROL SULFATE 0.83 MG/ML
2.5 SOLUTION RESPIRATORY (INHALATION) ONCE AS NEEDED
Status: DISCONTINUED | OUTPATIENT
Start: 2024-12-20 | End: 2024-12-21

## 2024-12-20 RX ORDER — LIDOCAINE HYDROCHLORIDE 10 MG/ML
0.1 INJECTION, SOLUTION EPIDURAL; INFILTRATION; INTRACAUDAL; PERINEURAL ONCE
OUTPATIENT
Start: 2024-12-20 | End: 2024-12-20

## 2024-12-20 RX ADMIN — GADOTERATE MEGLUMINE 28 ML: 376.9 INJECTION INTRAVENOUS at 15:51

## 2024-12-20 RX ADMIN — ONDANSETRON HYDROCHLORIDE 4 MG: 2 INJECTION, SOLUTION INTRAVENOUS at 15:30

## 2024-12-20 RX ADMIN — HEPARIN SODIUM 7500 UNITS: 5000 INJECTION, SOLUTION INTRAVENOUS; SUBCUTANEOUS at 13:27

## 2024-12-20 RX ADMIN — HEPARIN SODIUM 7500 UNITS: 5000 INJECTION, SOLUTION INTRAVENOUS; SUBCUTANEOUS at 21:10

## 2024-12-20 RX ADMIN — HEPARIN SODIUM 7500 UNITS: 5000 INJECTION, SOLUTION INTRAVENOUS; SUBCUTANEOUS at 06:30

## 2024-12-20 RX ADMIN — ACETYLCYSTEINE 10 G: 200 INJECTION, SOLUTION INTRAVENOUS at 10:09

## 2024-12-20 SDOH — HEALTH STABILITY: MENTAL HEALTH: CURRENT SMOKER: 0

## 2024-12-20 ASSESSMENT — COGNITIVE AND FUNCTIONAL STATUS - GENERAL
MOBILITY SCORE: 24
DAILY ACTIVITIY SCORE: 24
DAILY ACTIVITIY SCORE: 24
MOBILITY SCORE: 24

## 2024-12-20 ASSESSMENT — PAIN - FUNCTIONAL ASSESSMENT
PAIN_FUNCTIONAL_ASSESSMENT: 0-10

## 2024-12-20 ASSESSMENT — PAIN SCALES - GENERAL
PAINLEVEL_OUTOF10: 8
PAINLEVEL_OUTOF10: 6
PAIN_LEVEL: 7
PAINLEVEL_OUTOF10: 8
PAINLEVEL_OUTOF10: 6
PAINLEVEL_OUTOF10: 8

## 2024-12-20 ASSESSMENT — PAIN DESCRIPTION - DESCRIPTORS: DESCRIPTORS: CRAMPING

## 2024-12-20 NOTE — ANESTHESIA PREPROCEDURE EVALUATION
Patient: Deepthi Wild    Procedure Information       Anesthesia Start Date/Time: 12/20/24 1448    Procedure: MR MRCP WITH PANCREAS WO AND W CONTRAST    [place] [facility] [place]:    12/20/24 1200   BP: 111/74   Pulse: 72   Resp: 14   Temp: 36.1 °C (97 °F)   SpO2: 99%       No past surgical history on file.  No past medical history on file.    Current Facility-Administered Medications:     acetylcysteine (Acetadote) 10 g in dextrose 5% 1,000 mL infusion, 10,000 mg, intravenous, Once, Karissa Tang DO, Restarted at 12/20/24 1055    gadoterate meglumine (Dotarem) 0.5 mmol/mL contrast injection 28 mL, 28 mL, intravenous, Once in imaging, Karissa Tang DO    heparin (porcine) injection 7,500 Units, 7,500 Units, subcutaneous, q8h TANIA, Benito Cárdenas DO, 7,500 Units at 12/20/24 1327    lidocaine PF (Xylocaine) 10 mg/mL (1 %) injection 50 mg, 5 mL, infiltration, Once, Che Fair MD    lidocaine PF (Xylocaine) 10 mg/mL (1 %) injection 50 mg, 5 mL, infiltration, Once, Karissa Tang DO    oxyCODONE (Roxicodone) immediate release tablet 2.5 mg, 2.5 mg, oral, q6h PRN, Codey Emmanuel DO    oxyCODONE (Roxicodone) immediate release tablet 5 mg, 5 mg, oral, q6h PRN, Codey Emmanuel DO, 5 mg at 12/18/24 1344    prochlorperazine (Compazine) injection 10 mg, 10 mg, intravenous, q6h PRN, Benito Cárdenas DO    Facility-Administered Medications Ordered in Other Encounters:     fentaNYL PF (Sublimaze) injection, , intravenous, PRN, Sita Blum APRN-CRNA, 50 mcg at 12/20/24 1458    lidocaine PF (Xylocaine) 5 mg/mL (0.5 %) injection, , injection, PRN, Sita Blum APRN-CRNA, 60 mL at 12/20/24 1455    midazolam (Versed) injection, , intravenous, PRN, XAVIER Pimentel-CRNA, 2 mg at 12/20/24 1453    propofol (Diprivan) injection, , intravenous, PRN, ROSMERY PimentelCRNA, 200 mg at 12/20/24 1455    sodium chloride 0.9 % bolus, , intravenous, Continuous PRN, Sita Blum,  "APRN-CRNA, Stopped at 12/20/24 1526  Prior to Admission medications    Medication Sig Start Date End Date Taking? Authorizing Provider   liraglutide (Victoza 2-Bart) 0.6 mg/0.1 mL (18 mg/3 mL) injection Inject 0.3 mL (1.8 mg) under the skin once daily.   Yes Historical Provider, MD     Allergies   Allergen Reactions    Ketorolac Nausea/vomiting    Topiramate Itching     \"Made her crazy\"     Social History     Tobacco Use    Smoking status: Never    Smokeless tobacco: Never   Substance Use Topics    Alcohol use: Not on file         Chemistry    Lab Results   Component Value Date/Time     12/20/2024 0704    K 4.0 12/20/2024 0704     12/20/2024 0704    CO2 23 12/20/2024 0704    BUN 7 12/20/2024 0704    CREATININE 0.42 (L) 12/20/2024 0704    Lab Results   Component Value Date/Time    CALCIUM 9.2 12/20/2024 0704    ALKPHOS 220 (H) 12/20/2024 0704     (H) 12/20/2024 0704     (H) 12/20/2024 0704    BILITOT 1.8 (H) 12/20/2024 0704          Lab Results   Component Value Date/Time    WBC 5.9 12/19/2024 0718    HGB 12.7 12/19/2024 0718    HCT 39.1 12/19/2024 0718     12/19/2024 0718     Lab Results   Component Value Date/Time    PROTIME 11.4 12/20/2024 0704    INR 1.0 12/20/2024 0704     Encounter Date: 12/16/24   Electrocardiogram, 12-lead PRN ACS symptoms   Result Value    Ventricular Rate 83    Atrial Rate 83    WV Interval 184    QRS Duration 88    QT Interval 348    QTC Calculation(Bazett) 408    P Axis 20    R Axis 8    T Axis -1    QRS Count 14    Q Onset 218    P Onset 126    P Offset 193    T Offset 392    QTC Fredericia 388    Narrative    Normal sinus rhythm  Minimal voltage criteria for LVH, may be normal variant  Cannot rule out Anterior infarct , age undetermined  Abnormal ECG  No previous ECGs available  Confirmed by Rachel Patel ([id number]) on 12/19/2024 6:48:41 AM  Also confirmed by Rachel Patel ([id number])  on 12/19/2024 7:18:19 AM        Relevant Problems   Liver   (+) Acute hepatitis "       Clinical information reviewed:    Allergies  Meds               NPO Detail:  No data recorded     Physical Exam    Airway  Mallampati: II  TM distance: >3 FB     Cardiovascular - normal exam  Rhythm: regular  Rate: normal     Dental - normal exam     Pulmonary - normal exam     Abdominal - normal exam  Abdomen: soft             Anesthesia Plan    History of general anesthesia?: yes  History of complications of general anesthesia?: no    ASA 2     general     The patient is not a current smoker.  Patient was previously instructed to abstain from smoking on day of procedure.  Patient did not smoke on day of procedure.  Education provided regarding risk of obstructive sleep apnea.  intravenous induction   Postoperative administration of opioids is intended.  Anesthetic plan and risks discussed with patient.  Use of blood products discussed with patient who.    Plan discussed with CAA.

## 2024-12-20 NOTE — ANESTHESIA PROCEDURE NOTES
Airway  Date/Time: 12/20/2024 2:50 PM  Urgency: elective    Airway not difficult    Staffing  Performed: CRNA   Authorized by: Bruce Mratinez DO    Performed by: XAVIER Pimentel-FRIDA  Patient location during procedure: OR    Indications and Patient Condition  Indications for airway management: anesthesia and airway protection  Spontaneous ventilation: present  Sedation level: deep  Preoxygenated: yes  Patient position: sniffing  MILS maintained throughout  Mask difficulty assessment: 2 - vent by mask + OA or adjuvant +/- NMBA  No planned trial extubation    Final Airway Details  Final airway type: supraglottic airway      Successful airway: unique  Size 4     Number of attempts at approach: 1  Ventilation between attempts: 2 hand mask  Number of other approaches attempted: 0

## 2024-12-20 NOTE — PROGRESS NOTES
"Subjective  Patient sitting up in bed with continued mild lower abdominal cramping and epigastric discomfort. No nausea or vomiting. Awaiting anesthesia assisted MRCP.  Liver enzymes downtrending, bilirubin stable at 1.8. INR 1.0. Patient remains A&O x3 with no change in mentation. Plan to follow up MRCP findings. Continue to follow liver enzymes, INR, mental status.       Objective  Blood pressure 111/74, pulse 72, temperature 36.1 °C (97 °F), temperature source Temporal, resp. rate 14, height 1.727 m (5' 8\"), weight 138 kg (303 lb 5.7 oz), SpO2 99%.    Physical Exam  Constitutional: Alert and interactive, in NAD  Eyes: PERRL, sclera clear, no conjunctival injection  Skin: Warm and dry, no rash or ecchymosis  ENMT: Mucous membranes moist, no lesions noted  Resp: CTAB, even and unlabored  CV: RRR, normal S1, S2, no m,r,g  GI: +BS, soft, round, mild lower abd and epigastric TTP, no rebound tenderness or guarding, no palpable masses or organomegaly  Extremities: Extremities warm, no edema, contusions, wounds or cyanosis  Neuro: Alert and oriented x3  Psych: Appropriate mood and behavior    Medications  Scheduled medications  acetylcysteine, 10,000 mg, intravenous, Once  heparin (porcine), 7,500 Units, subcutaneous, q8h TANIA  lidocaine, 5 mL, infiltration, Once  lidocaine, 5 mL, infiltration, Once  [Held by provider] liraglutide, 1.8 mg, subcutaneous, Daily      Continuous medications     PRN medications  PRN medications: oxyCODONE, oxyCODONE, prochlorperazine     Labs  Lab Results   Component Value Date    WBC 5.9 12/19/2024    HGB 12.7 12/19/2024    HCT 39.1 12/19/2024    MCV 93 12/19/2024     12/19/2024     Lab Results   Component Value Date    GLUCOSE 116 (H) 12/20/2024    CALCIUM 9.2 12/20/2024     12/20/2024    K 4.0 12/20/2024    CO2 23 12/20/2024     12/20/2024    BUN 7 12/20/2024    CREATININE 0.42 (L) 12/20/2024     Lab Results   Component Value Date     (H) 12/20/2024     (H) " "12/20/2024    ALKPHOS 220 (H) 12/20/2024    BILITOT 1.8 (H) 12/20/2024     No results found for: \"IRON\", \"TIBC\", \"FERRITIN\"  Lab Results   Component Value Date    INR 1.0 12/20/2024    INR 1.1 12/19/2024    INR 1.1 12/19/2024    PROTIME 11.4 12/20/2024    PROTIME 12.5 12/19/2024    PROTIME 12.9 (H) 12/19/2024       Radiology  RUQ US 12/16/2024 noting:  Impression:     No acute sonographic abnormality allowing for limitations above.    Signed by: Evan Finkelstein 12/16/2024 10:02 PM  Dictation workstation:   [id number]     CT angio chest A/P 12/16/2024 noting:  Impression:     1. No thoracic or abdominal aortic aneurysm or acute aortic pathology.  2. No acute abnormality of the chest, abdomen or pelvis.  3. Liver is borderline enlarged, measuring up to 20 cm in  craniocaudal dimension, and demonstrates diffusely decreased  attenuation suggestive of fatty infiltration. Correlate with serum  LFTs.    Signed by: Kenyatta Pichardo 12/16/2024 7:16 PM  Dictation workstation:   [id number]     Assessment  Deepthi Wild is a 54 y.o. female with PMH of hepatic steatosis presenting with complaint of epigastric pain with recent Tylenol use to ES Tylenol-every 2 hours for a toothache, found to have transaminitis, concern for Tylenol toxicity initiated on NAC.  No regular alcohol use.  No acute abdominal findings or bilary dilation on CT or US.  Patient unable to tolerate MRCP due to claustrophobia. Pt continues N-AC.     Patient sitting up in bed with continued mild cramping in lower abdomen and mild epigastric discomfort.  No nausea or vomiting.  ALP up to 220.  ALT, AST downtrending. Bilirubin stable at 1.8 today.  INR 1.0. Mental status remains intact with no alterations.    # acute transaminitis  # abd pain- improved  # N/V- resolved  # recent excess Tylenol ingestion  # hepatic steatosis    Plan:  - continue supportive care  - NPO for MRCP with anesthesia  - continue to trend liver enzymes, INR daily  - continue to " monitor mental status  - NO NSAIDS  - continue analgesics and antiemetics as needed  - continue N-AC per poison control guidelines  - follow MRCP results    Plan has been discussed with Dr. Navarro. GI will continue to follow.     XAVIER Gilbert/CNP

## 2024-12-20 NOTE — PROGRESS NOTES
Deepthi Wild is a 54 y.o. female on day 2 of admission presenting with Acute hepatitis.      Subjective   Patient was seen and examined at bedside, she was NPO for the MRCP, unfortuntely wasn't done , but she was in no acute distress. Denies any f/c, n/v, vision changes, chest pain, dyspnea, abdominal pain, changes in BM, or urinary changes.         Objective     Last Recorded Vitals  /74   Pulse 88   Temp 36.3 °C (97.3 °F) (Temporal)   Resp 16   Wt 138 kg (303 lb 5.7 oz)   SpO2 95%   Intake/Output last 3 Shifts:    Intake/Output Summary (Last 24 hours) at 12/19/2024 2050  Last data filed at 12/18/2024 2200  Gross per 24 hour   Intake 1050 ml   Output --   Net 1050 ml       Admission Weight  Weight: 136 kg (300 lb) (12/16/24 2210)    Daily Weight  12/17/24 : 138 kg (303 lb 5.7 oz)    Image Results  XR pelvis 1-2 views  Narrative: Interpreted By:  Sam Nye,   STUDY:  XR PELVIS 1-2 VIEWS      INDICATION:  Signs/Symptoms:Screening for old shots/Foreign bodies before MRI.      COMPARISON:  None      ACCESSION NUMBER(S):  [id number]      ORDERING CLINICIAN:  USHA OTTO      FINDINGS:  No osseous, articular, or soft tissue abnormality. No radiopaque  foreign body.      Impression: Normal pelvic radiographs.      Signed by: Sam Nye 12/19/2024 3:08 PM  Dictation workstation:   [id number]  Electrocardiogram, 12-lead PRN ACS symptoms  Normal sinus rhythm  Minimal voltage criteria for LVH, may be normal variant  Cannot rule out Anterior infarct , age undetermined  Abnormal ECG  No previous ECGs available  Confirmed by Rachel Patel ([id number]) on 12/19/2024 6:48:41 AM  Also confirmed by Rachel Patel ([id number])  on 12/19/2024 7:18:19 AM      Physical Exam  Constitutional:       General: She is not in acute distress.     Appearance: Normal appearance. She is obese.   HENT:      Head: Normocephalic and atraumatic.      Nose: Nose normal.      Mouth/Throat:      Mouth: Mucous membranes are dry.   Eyes:       Extraocular Movements: Extraocular movements intact.      Conjunctiva/sclera: Conjunctivae normal.      Pupils: Pupils are equal, round, and reactive to light.   Cardiovascular:      Rate and Rhythm: Normal rate and regular rhythm.      Pulses: Normal pulses.      Heart sounds: Normal heart sounds.   Pulmonary:      Effort: Pulmonary effort is normal.      Breath sounds: Normal breath sounds.   Abdominal:      General: Abdomen is flat. Bowel sounds are normal.      Palpations: Abdomen is soft.   Musculoskeletal:         General: Normal range of motion.      Cervical back: Normal range of motion and neck supple.   Skin:     General: Skin is warm and dry.   Neurological:      General: No focal deficit present.      Mental Status: She is alert and oriented to person, place, and time. Mental status is at baseline.   Psychiatric:      Comments: Anxious         Relevant Results    Scheduled medications  heparin (porcine), 7,500 Units, subcutaneous, q8h TANIA  lidocaine, 5 mL, infiltration, Once  [Held by provider] liraglutide, 1.8 mg, subcutaneous, Daily      Continuous medications     PRN medications  PRN medications: oxyCODONE, oxyCODONE, prochlorperazine                   Assessment/Plan      Assessment & Plan  Acute hepatitis  Suspected DILI  Hx of NAFLD  Hx of fibromyalgia   Hx of GERD  Headache  54 YOF NAFLD, fibromyalgia, chronic back pain presenting to [facility] ED c/o epigastric pain.  Significant for new transaminitis with normal bilirubin, concern for DILI as patient has endorsed daily APAP use for the last 3 to 4 weeks, she also recently returned from the [facility], treated with Augmentin for URI.  Case discussed with poison control who recommended starting NAC for subacute APAP toxicity.  Of note her INR, renal function were WNL.     - Continue NAC protocol.  - Trend LFTs, trending down today.   - Antiemetics   - GI consulted appreciate recommendations  - Follow up with poison control  - Avoid hepatotoxic drugs  -  Headache, got Toradol and oxycodone with no improvement, dilaudid given and the patient was examined, no focal deficits, her headache subsided after the dilaudid, she was told if her headache doesn't subside, we will do head, and she was not happy.    - NPO at midnight for possible MRCP or in case endoscopic intervention is needed, anesthesia consulted for sedation during the MRCP     IVF: PO   DVT Ppx: Heparin  Diet: regular  Code Status: FULL CODE      Admitted for acute hepatitis, with suspected DILI secondary to subacute APAP toxicity superimposed on history of NAFLD, numbers slightly down trending today,  continue current management.                  Codey Emmanuel, DO

## 2024-12-20 NOTE — ANESTHESIA POSTPROCEDURE EVALUATION
Patient: Deepthi Wild    Procedure Summary       Date: 12/20/24 Room / Location: [facility]    Anesthesia Start: 1448 Anesthesia Stop: 1548    Procedure: MR MRCP WITH PANCREAS WO AND W CONTRAST Diagnosis: (Elevated LFT'S of unclear etiology)    Scheduled Providers:  Responsible Provider: Bruce Martinez DO    Anesthesia Type: general ASA Status: 2            Anesthesia Type: general    Vitals Value Taken Time   /72 12/20/24 1548   Temp 36.5 °C (97.7 °F) 12/20/24 1548   Pulse 72 12/20/24 1548   Resp 22 12/20/24 1548   SpO2 95 % 12/20/24 1548       Anesthesia Post Evaluation    Patient participation: complete - patient cannot participate  Level of consciousness: awake and alert  Pain score: 7  Pain management: adequate  Airway patency: patent  Cardiovascular status: acceptable, hemodynamically stable and stable  Respiratory status: acceptable, room air and unassisted  Hydration status: acceptable  Postoperative Nausea and Vomiting: none        There were no known notable events for this encounter.

## 2024-12-20 NOTE — NURSING NOTE
1417: Pt off floor to MRI at this time. Pt sent down with Acetadote infusion running.    1620: Pt returned to floor after MRI under anesthesia at this time.     EOS note: No acute changes this shift. Remains Aox4, room air, on tele- NSR, independent in room (moderate falls risk, refusing alarms). No complaints of pain. MRCP under anesthesia completed this shift, pt tolerated well. Acetadote infusion running continuously per orders at this time. Pt resting comfortably in bed with call light within reach at this time.

## 2024-12-20 NOTE — PROGRESS NOTES
Deepthi Wild is a 54 y.o. female on day 3 of admission presenting with Acute hepatitis.      Subjective   Patient was seen and examined at bedside.     Objective     Last Recorded Vitals  /69 (BP Location: Left arm, Patient Position: Lying)   Pulse 74   Temp 35.6 °C (96.1 °F) (Temporal)   Resp 16   Wt 138 kg (303 lb 5.7 oz)   SpO2 98%   Intake/Output last 3 Shifts:    Intake/Output Summary (Last 24 hours) at 12/20/2024 1714  Last data filed at 12/20/2024 1526  Gross per 24 hour   Intake 1000 ml   Output --   Net 1000 ml       Admission Weight  Weight: 136 kg (300 lb) (12/16/24 2210)    Daily Weight  12/17/24 : 138 kg (303 lb 5.7 oz)    Image Results  MRCP pancreas w and wo IV contrast  Narrative: Interpreted By:  Kirill Hudson,   STUDY:  MRCP PANCREAS W AND WO IV CONTRAST;  12/20/2024 3:50 pm      INDICATION:  Signs/Symptoms:Elevated LFT'S of unclear etiology.          COMPARISON:  12/16/2024      ACCESSION NUMBER(S):  [id number]      ORDERING CLINICIAN:  USHA OTTO      TECHNIQUE:  MRI LIVER; Multiplanar magnetic resonance images of the abdomen were  obtained including the following sequences; T2-weighted SSFSE with  and without fat saturation, T1-weighted GRE in/opposed phase, DWI,  fat saturated 3D-T1w GRE pre and dynamically post contrast.  28 ML of  Dotarem was administered intravenously without immediate complication.      FINDINGS:  Motion degraded exam.      LIVER:  19 cm in length. Diffuse severe signal dropout on out of phase  imaging indicating steatosis. No definite focal lesions identified.      BILE DUCTS:  No dilatation. Common bile duct measures 3 mm. No filling defect,  stricture, or extrinsic compression identified.      GALLBLADDER:  No stone or wall thickening.      PANCREAS:  9 mm elongated cyst in the head, probably in communication with the  nondilated main pancreatic duct.. No pancreas divisum.      SPLEEN:  Unremarkable.      ADRENAL GLANDS:  Unremarkable.      KIDNEYS:  Small  exophytic simple cyst in the right lower pole. Otherwise  unremarkable kidneys without hydronephrosis.      LYMPH NODES:  No lymphadenopathy.      ABDOMINAL VESSELS:  Abdominal aorta is patent without aneurysm. Major visceral arterial  branches are patent. Major portal venous branches are patent. IVC and  visualized major branches are patent.      BOWEL:  No dilated bowel is visualized.      PERITONEUM/RETROPERITONEUM:  No visualized free fluid.      BONES AND LOWER THORAX:  No abnormal marrow enhancement. Possible upper lobe infiltrates noted  on wide field-of-view images.          Impression: 1.  Severe hepatic steatosis.  2. No biliary tract stone or obstruction.  3. Subcentimeter cystic lesion of the pancreas, perhaps side branch  IPMN. Suggest surveillance MRCP in 1 year to document stability.  4. Otherwise no acute findings of the abdomen identified on motion  degraded exam.  5. Possible upper lobe infiltrates. Consider chest radiograph.          MACRO:  None      Signed by: Kirill Hudson 12/20/2024 4:06 PM  Dictation workstation:   [id number]  Bedside Midline Imaging  These images are not reportable by radiology and will not be interpreted   by  Radiologists.      PE:  Constitutional: Well developed, no distress, alert and cooperative, obese   Skin: Warm and dry  Eyes: EOMI, clear sclera  ENMT: mucous membranes moist  Respiratory: Patent airways, CTAB  Cardiovascular: Regular, rate and rhythm  Abdominal: Nondistended, soft, non-tender, +BS  MSK: ROM intact  Neuro: alert and oriented x3      Relevant Results    Scheduled medications  acetylcysteine, 10,000 mg, intravenous, Once  heparin (porcine), 7,500 Units, subcutaneous, q8h TANIA  lidocaine, 5 mL, infiltration, Once  lidocaine, 5 mL, infiltration, Once      Continuous medications     PRN medications  PRN medications: albuterol, fentaNYL PF, HYDROmorphone, labetaloL, meperidine, midazolam, ondansetron, oxyCODONE, oxyCODONE, oxyCODONE, oxygen,  prochlorperazine, promethazine                   Assessment/Plan      Assessment & Plan  Acute hepatitis  Suspected DILI  Hx of NAFLD  Hx of fibromyalgia   Hx of GERD  Headache  54 YOF NAFLD, fibromyalgia, chronic back pain presenting to [facility] ED c/o epigastric pain.  Significant for new transaminitis with normal bilirubin, concern for DILI as patient has endorsed daily APAP use for the last 3 to 4 weeks, she also recently returned from the [facility], treated with Augmentin for URI.  Case discussed with poison control who recommended starting NAC for subacute APAP toxicity.  Of note her INR, renal function were WNL.     - Continue NAC protocol.  - Trend LFTs, similar today   - Antiemetics   - GI consulted appreciate recommendations  - Follow up with poison control  - Avoid hepatotoxic drugs  - MRCP obtained, results reviewed  -Midline placed for better access      IVF: PO   DVT Ppx: Heparin  Diet: regular  Code Status: FULL CODE      Dispo: Admitted for acute hepatitis, with suspected DILI secondary to subacute APAP toxicity superimposed on history of NAFLD. Continues to require continued treatment.                  Karissa Tang, DO

## 2024-12-20 NOTE — PROGRESS NOTES
Spiritual Care Visit  Spiritual Care Request    Reason for Visit:        Request Received From:       Focus of Care:  Visited With: Patient not available         Refer to :          Spiritual Care Assessment    Spiritual Assessment:                      Care Provided:       Sense of Community and or Adventist Affiliation:  Buddhist         Addressed Needs/Concerns and/or Jadyn Through:          Outcome:        Advance Directives:         Spiritual Care Annotation    Annotation:    The patient was out when I stopped by. I'll try again.

## 2024-12-20 NOTE — PROCEDURES
4 Wolof 12 cm length Bard single lumen power midline inserted to right basilic vein using modified seldinger technique and ultrasound guidance with single attempt. Insertion site was cleansed with chloraprep x2, pt draped in normal sterile fashion and usual sterile precautions observed.  Midline flushes easily and has brisk blood return. Midline secured with stat lock and dressed with biopatch and tegaderm. Midline is ready for immediate use. Pt tolerated procedure with no apparent complications.

## 2024-12-21 LAB
ALBUMIN SERPL BCP-MCNC: 3.4 G/DL (ref 3.4–5)
ALP SERPL-CCNC: 211 U/L (ref 33–110)
ALT SERPL W P-5'-P-CCNC: 465 U/L (ref 7–45)
ANION GAP SERPL CALC-SCNC: 12 MMOL/L (ref 10–20)
AST SERPL W P-5'-P-CCNC: 184 U/L (ref 9–39)
BILIRUB SERPL-MCNC: 1.4 MG/DL (ref 0–1.2)
BUN SERPL-MCNC: 7 MG/DL (ref 6–23)
CALCIUM SERPL-MCNC: 9 MG/DL (ref 8.6–10.3)
CHLORIDE SERPL-SCNC: 105 MMOL/L (ref 98–107)
CO2 SERPL-SCNC: 23 MMOL/L (ref 21–32)
CREAT SERPL-MCNC: 0.48 MG/DL (ref 0.5–1.05)
EGFRCR SERPLBLD CKD-EPI 2021: >90 ML/MIN/1.73M*2
ERYTHROCYTE [DISTWIDTH] IN BLOOD BY AUTOMATED COUNT: 13.9 % (ref 11.5–14.5)
GLUCOSE SERPL-MCNC: 143 MG/DL (ref 74–99)
HCT VFR BLD AUTO: 39.1 % (ref 36–46)
HGB BLD-MCNC: 12.6 G/DL (ref 12–16)
INR PPP: 1 (ref 0.9–1.1)
MAGNESIUM SERPL-MCNC: 1.74 MG/DL (ref 1.6–2.4)
MCH RBC QN AUTO: 30.2 PG (ref 26–34)
MCHC RBC AUTO-ENTMCNC: 32.2 G/DL (ref 32–36)
MCV RBC AUTO: 94 FL (ref 80–100)
NRBC BLD-RTO: 0 /100 WBCS (ref 0–0)
PHOSPHATE SERPL-MCNC: 2.5 MG/DL (ref 2.5–4.9)
PLATELET # BLD AUTO: 145 X10*3/UL (ref 150–450)
POTASSIUM SERPL-SCNC: 4 MMOL/L (ref 3.5–5.3)
PROT SERPL-MCNC: 6.8 G/DL (ref 6.4–8.2)
PROTHROMBIN TIME: 11.8 SECONDS (ref 9.8–12.8)
RBC # BLD AUTO: 4.17 X10*6/UL (ref 4–5.2)
SODIUM SERPL-SCNC: 136 MMOL/L (ref 136–145)
WBC # BLD AUTO: 8.6 X10*3/UL (ref 4.4–11.3)

## 2024-12-21 PROCEDURE — 80053 COMPREHEN METABOLIC PANEL: CPT | Performed by: INTERNAL MEDICINE

## 2024-12-21 PROCEDURE — 84100 ASSAY OF PHOSPHORUS: CPT | Performed by: STUDENT IN AN ORGANIZED HEALTH CARE EDUCATION/TRAINING PROGRAM

## 2024-12-21 PROCEDURE — 84075 ASSAY ALKALINE PHOSPHATASE: CPT | Performed by: INTERNAL MEDICINE

## 2024-12-21 PROCEDURE — 99233 SBSQ HOSP IP/OBS HIGH 50: CPT | Performed by: STUDENT IN AN ORGANIZED HEALTH CARE EDUCATION/TRAINING PROGRAM

## 2024-12-21 PROCEDURE — 1200000002 HC GENERAL ROOM WITH TELEMETRY DAILY

## 2024-12-21 PROCEDURE — 2500000004 HC RX 250 GENERAL PHARMACY W/ HCPCS (ALT 636 FOR OP/ED)

## 2024-12-21 PROCEDURE — 85027 COMPLETE CBC AUTOMATED: CPT | Performed by: INTERNAL MEDICINE

## 2024-12-21 PROCEDURE — 83735 ASSAY OF MAGNESIUM: CPT | Performed by: STUDENT IN AN ORGANIZED HEALTH CARE EDUCATION/TRAINING PROGRAM

## 2024-12-21 PROCEDURE — 2500000004 HC RX 250 GENERAL PHARMACY W/ HCPCS (ALT 636 FOR OP/ED): Performed by: STUDENT IN AN ORGANIZED HEALTH CARE EDUCATION/TRAINING PROGRAM

## 2024-12-21 PROCEDURE — 85610 PROTHROMBIN TIME: CPT | Performed by: INTERNAL MEDICINE

## 2024-12-21 RX ADMIN — HEPARIN SODIUM 7500 UNITS: 5000 INJECTION, SOLUTION INTRAVENOUS; SUBCUTANEOUS at 14:42

## 2024-12-21 RX ADMIN — ACETYLCYSTEINE 10 G: 200 INJECTION, SOLUTION INTRAVENOUS at 10:46

## 2024-12-21 RX ADMIN — HEPARIN SODIUM 7500 UNITS: 5000 INJECTION, SOLUTION INTRAVENOUS; SUBCUTANEOUS at 05:59

## 2024-12-21 RX ADMIN — HEPARIN SODIUM 7500 UNITS: 5000 INJECTION, SOLUTION INTRAVENOUS; SUBCUTANEOUS at 22:08

## 2024-12-21 ASSESSMENT — COGNITIVE AND FUNCTIONAL STATUS - GENERAL
DAILY ACTIVITIY SCORE: 24
MOBILITY SCORE: 24

## 2024-12-21 ASSESSMENT — PAIN SCALES - GENERAL
PAINLEVEL_OUTOF10: 0 - NO PAIN
PAINLEVEL_OUTOF10: 0 - NO PAIN

## 2024-12-21 ASSESSMENT — PAIN - FUNCTIONAL ASSESSMENT: PAIN_FUNCTIONAL_ASSESSMENT: 0-10

## 2024-12-21 NOTE — PROGRESS NOTES
Deepthi Wild is a 54 y.o. female on day 4 of admission presenting with Acute hepatitis.      Subjective   Patient was seen and examined at bedside. Patient reports that she is very frustrated today.  She states she is anxious over her MRI results.  We discussed them in detail.  Also notified patient that I did try calling yesterday to give report to her. All of her questions were answered in detail.  Patient has strong family history of cirrhosis in her father and likely brother and that is adding a layer of stress to her. Support provided.     Objective     Last Recorded Vitals  /72   Pulse 76   Temp 36.2 °C (97.2 °F) Comment: pt felt flushed at this time. VS checked. pt reports no other symptoms  Resp 16   Wt 138 kg (303 lb 5.7 oz)   SpO2 96%   Intake/Output last 3 Shifts:    Intake/Output Summary (Last 24 hours) at 12/21/2024 1448  Last data filed at 12/20/2024 1526  Gross per 24 hour   Intake 400 ml   Output --   Net 400 ml       Admission Weight  Weight: 136 kg (300 lb) (12/16/24 2210)    Daily Weight  12/17/24 : 138 kg (303 lb 5.7 oz)    Image Results  MRCP pancreas w and wo IV contrast  Narrative: Interpreted By:  Kirill Hudson,   STUDY:  MRCP PANCREAS W AND WO IV CONTRAST;  12/20/2024 3:50 pm      INDICATION:  Signs/Symptoms:Elevated LFT'S of unclear etiology.          COMPARISON:  12/16/2024      ACCESSION NUMBER(S):  [id number]      ORDERING CLINICIAN:  USHA OTTO      TECHNIQUE:  MRI LIVER; Multiplanar magnetic resonance images of the abdomen were  obtained including the following sequences; T2-weighted SSFSE with  and without fat saturation, T1-weighted GRE in/opposed phase, DWI,  fat saturated 3D-T1w GRE pre and dynamically post contrast.  28 ML of  Dotarem was administered intravenously without immediate complication.      FINDINGS:  Motion degraded exam.      LIVER:  19 cm in length. Diffuse severe signal dropout on out of phase  imaging indicating steatosis. No definite focal lesions  identified.      BILE DUCTS:  No dilatation. Common bile duct measures 3 mm. No filling defect,  stricture, or extrinsic compression identified.      GALLBLADDER:  No stone or wall thickening.      PANCREAS:  9 mm elongated cyst in the head, probably in communication with the  nondilated main pancreatic duct.. No pancreas divisum.      SPLEEN:  Unremarkable.      ADRENAL GLANDS:  Unremarkable.      KIDNEYS:  Small exophytic simple cyst in the right lower pole. Otherwise  unremarkable kidneys without hydronephrosis.      LYMPH NODES:  No lymphadenopathy.      ABDOMINAL VESSELS:  Abdominal aorta is patent without aneurysm. Major visceral arterial  branches are patent. Major portal venous branches are patent. IVC and  visualized major branches are patent.      BOWEL:  No dilated bowel is visualized.      PERITONEUM/RETROPERITONEUM:  No visualized free fluid.      BONES AND LOWER THORAX:  No abnormal marrow enhancement. Possible upper lobe infiltrates noted  on wide field-of-view images.          Impression: 1.  Severe hepatic steatosis.  2. No biliary tract stone or obstruction.  3. Subcentimeter cystic lesion of the pancreas, perhaps side branch  IPMN. Suggest surveillance MRCP in 1 year to document stability.  4. Otherwise no acute findings of the abdomen identified on motion  degraded exam.  5. Possible upper lobe infiltrates. Consider chest radiograph.          MACRO:  None      Signed by: Kirill Hudson 12/20/2024 4:06 PM  Dictation workstation:   [id number]  Bedside Midline Imaging  These images are not reportable by radiology and will not be interpreted   by  Radiologists.      PE:  Constitutional: Well developed, no distress, alert and cooperative, obese   Skin: Warm and dry  Eyes: EOMI, clear sclera  ENMT: mucous membranes moist  Respiratory: Patent airways, CTAB  Cardiovascular: Regular, rate and rhythm  Abdominal: Nondistended, soft, non-tender, +BS  MSK: ROM intact  Neuro: alert and oriented  x3      Relevant Results    Scheduled medications  acetylcysteine, 10,000 mg, intravenous, Once  heparin (porcine), 7,500 Units, subcutaneous, q8h TANIA  lidocaine, 5 mL, infiltration, Once  lidocaine, 5 mL, infiltration, Once      Continuous medications     PRN medications  PRN medications: oxyCODONE, oxyCODONE, oxygen, prochlorperazine                   Assessment/Plan      Assessment & Plan  Acute hepatitis  Suspected DILI  Hx of NAFLD  Hx of fibromyalgia   Hx of GERD  Headache    - Continue NAC protocol  - Trend LFTs, downtrending, within limits except  which needs to be <388 to stop NAC   - Antiemetics   - GI consulted   -MRCP reviewed in detail with patient, will need f/u in 1 year  - Follow up with poison control  - Avoid hepatotoxic drugs  -Midline placed for better access      IVF: PO   DVT Ppx: Heparin  Diet: regular  Code Status: FULL CODE      Dispo: Admitted for acute hepatitis, with suspected DILI secondary to subacute APAP toxicity superimposed on history of NAFLD. Continues to require continued treatment. Anticipate discharge in 24-48hrs.                  Karissa Tang, DO

## 2024-12-21 NOTE — NURSING NOTE
0137 - Poison control, Halle called from [phone].  Called for an update on pt and asked if there were any changes.  Acetodote infusion supposed to been near completion per caller, however, per the infusion pump, there is 6hr and 48min left of the infusion.       0149 - Call placed to Pharmacy to clarify need for time specific lab draw.   Pharmacist unsure on protocol, however, researched further on Poison controls website.  Previously told by handoff nurse, that repeat labs to be drawn 2hrs prior to infusion completion.  The reason the completion is delayed is due to pt. Being off the floor this afternoon for MRCP.    The remaining volume to be infused is 446cc and 6hr 48min left.   AM Labs currently ordered for 0500.  Pharmacist states to request labs be drawn within [id number] time frame.  Will continue to monitor at this time.

## 2024-12-21 NOTE — PROGRESS NOTES
"Subjective  Patient resting comfortably today with no acute complaints.   Obtained MRCP overnight with no findings of biliary dilation  Liver enzymes downtrending,INR 1.0.   Objective  Blood pressure 98/70, pulse 77, temperature 35.7 °C (96.3 °F), temperature source Temporal, resp. rate 18, height 1.727 m (5' 8\"), weight 138 kg (303 lb 5.7 oz), SpO2 96%.    Physical Exam  Constitutional: Alert and interactive, in NAD  Eyes: PERRL, sclera clear, no conjunctival injection  Skin: Warm and dry, no rash or ecchymosis  ENMT: Mucous membranes moist, no lesions noted  Resp: CTAB, even and unlabored  CV: RRR, normal S1, S2, no m,r,g  GI: +BS, soft, round, mild lower abd and epigastric TTP, no rebound tenderness or guarding, no palpable masses or organomegaly  Extremities: Extremities warm, no edema, contusions, wounds or cyanosis  Neuro: Alert and oriented x3  Psych: Appropriate mood and behavior    Medications  Scheduled medications  heparin (porcine), 7,500 Units, subcutaneous, q8h TANIA  lidocaine, 5 mL, infiltration, Once  lidocaine, 5 mL, infiltration, Once      Continuous medications     PRN medications  PRN medications: albuterol, fentaNYL PF, HYDROmorphone, labetaloL, meperidine, midazolam, ondansetron, oxyCODONE, oxyCODONE, oxyCODONE, oxygen, prochlorperazine, promethazine     Labs  Lab Results   Component Value Date    WBC 8.6 12/21/2024    HGB 12.6 12/21/2024    HCT 39.1 12/21/2024    MCV 94 12/21/2024     (L) 12/21/2024     Lab Results   Component Value Date    GLUCOSE 143 (H) 12/21/2024    CALCIUM 9.0 12/21/2024     12/21/2024    K 4.0 12/21/2024    CO2 23 12/21/2024     12/21/2024    BUN 7 12/21/2024    CREATININE 0.48 (L) 12/21/2024     Lab Results   Component Value Date     (H) 12/21/2024     (H) 12/21/2024    ALKPHOS 211 (H) 12/21/2024    BILITOT 1.4 (H) 12/21/2024     No results found for: \"IRON\", \"TIBC\", \"FERRITIN\"  Lab Results   Component Value Date    INR 1.0 12/21/2024    " INR 1.0 12/20/2024    INR 1.1 12/19/2024    PROTIME 11.8 12/21/2024    PROTIME 11.4 12/20/2024    PROTIME 12.5 12/19/2024       Radiology  RUQ US 12/16/2024 noting:  Impression:     No acute sonographic abnormality allowing for limitations above.    Signed by: Evan Finkelstein 12/16/2024 10:02 PM  Dictation workstation:   [id number]     CT angio chest A/P 12/16/2024 noting:  Impression:     1. No thoracic or abdominal aortic aneurysm or acute aortic pathology.  2. No acute abnormality of the chest, abdomen or pelvis.  3. Liver is borderline enlarged, measuring up to 20 cm in  craniocaudal dimension, and demonstrates diffusely decreased  attenuation suggestive of fatty infiltration. Correlate with serum  LFTs.    Signed by: Kenyatta Pichardo 12/16/2024 7:16 PM  Dictation workstation:   [id number]     Assessment  Deepthi Wild is a 54 y.o. female with PMH of hepatic steatosis presenting with complaint of epigastric pain with recent Tylenol use to ES Tylenol-every 2 hours for a toothache, found to have transaminitis, concern for Tylenol toxicity initiated on NAC.  No regular alcohol use.  No acute abdominal findings or bilary dilation on CT or US or MR.       # acute transaminitis  # abd pain- improved  # N/V- resolved  # recent excess Tylenol ingestion  # hepatic steatosis    Plan:  - Continue NAC per poison control protocol however with Liver enzyme decrease by ~ 50%, normal INR and intact mentation reasonable otherwise to  discharge from GI standpoint. Will arrange clinic follow up for 4 weeks with lab recheck in 2 weeks      Waleska Navarro MD

## 2024-12-22 VITALS
TEMPERATURE: 96.4 F | DIASTOLIC BLOOD PRESSURE: 68 MMHG | HEIGHT: 68 IN | OXYGEN SATURATION: 96 % | HEART RATE: 70 BPM | BODY MASS INDEX: 44.41 KG/M2 | WEIGHT: 293 LBS | RESPIRATION RATE: 16 BRPM | SYSTOLIC BLOOD PRESSURE: 102 MMHG

## 2024-12-22 PROBLEM — B17.9 ACUTE HEPATITIS: Status: RESOLVED | Noted: 2024-12-16 | Resolved: 2024-12-22

## 2024-12-22 LAB
ALBUMIN SERPL BCP-MCNC: 3.2 G/DL (ref 3.4–5)
ALBUMIN SERPL BCP-MCNC: 3.2 G/DL (ref 3.4–5)
ALP SERPL-CCNC: 183 U/L (ref 33–110)
ALP SERPL-CCNC: 184 U/L (ref 33–110)
ALT SERPL W P-5'-P-CCNC: 374 U/L (ref 7–45)
ALT SERPL W P-5'-P-CCNC: 393 U/L (ref 7–45)
ANION GAP SERPL CALC-SCNC: 10 MMOL/L (ref 10–20)
ANION GAP SERPL CALC-SCNC: 11 MMOL/L (ref 10–20)
AST SERPL W P-5'-P-CCNC: 137 U/L (ref 9–39)
AST SERPL W P-5'-P-CCNC: 148 U/L (ref 9–39)
BILIRUB SERPL-MCNC: 1.3 MG/DL (ref 0–1.2)
BILIRUB SERPL-MCNC: 1.8 MG/DL (ref 0–1.2)
BUN SERPL-MCNC: 8 MG/DL (ref 6–23)
BUN SERPL-MCNC: 8 MG/DL (ref 6–23)
CALCIUM SERPL-MCNC: 8.3 MG/DL (ref 8.6–10.3)
CALCIUM SERPL-MCNC: 8.6 MG/DL (ref 8.6–10.3)
CHLORIDE SERPL-SCNC: 105 MMOL/L (ref 98–107)
CHLORIDE SERPL-SCNC: 106 MMOL/L (ref 98–107)
CO2 SERPL-SCNC: 25 MMOL/L (ref 21–32)
CO2 SERPL-SCNC: 27 MMOL/L (ref 21–32)
CREAT SERPL-MCNC: 0.49 MG/DL (ref 0.5–1.05)
CREAT SERPL-MCNC: 0.49 MG/DL (ref 0.5–1.05)
EGFRCR SERPLBLD CKD-EPI 2021: >90 ML/MIN/1.73M*2
EGFRCR SERPLBLD CKD-EPI 2021: >90 ML/MIN/1.73M*2
ERYTHROCYTE [DISTWIDTH] IN BLOOD BY AUTOMATED COUNT: 14 % (ref 11.5–14.5)
GLUCOSE SERPL-MCNC: 126 MG/DL (ref 74–99)
GLUCOSE SERPL-MCNC: 96 MG/DL (ref 74–99)
HCT VFR BLD AUTO: 36.9 % (ref 36–46)
HGB BLD-MCNC: 12.1 G/DL (ref 12–16)
HOLD SPECIMEN: NORMAL
INR PPP: 1.1 (ref 0.9–1.1)
MAGNESIUM SERPL-MCNC: 1.84 MG/DL (ref 1.6–2.4)
MCH RBC QN AUTO: 30.4 PG (ref 26–34)
MCHC RBC AUTO-ENTMCNC: 32.8 G/DL (ref 32–36)
MCV RBC AUTO: 93 FL (ref 80–100)
NRBC BLD-RTO: 0 /100 WBCS (ref 0–0)
PHOSPHATE SERPL-MCNC: 3.2 MG/DL (ref 2.5–4.9)
PLATELET # BLD AUTO: 175 X10*3/UL (ref 150–450)
POTASSIUM SERPL-SCNC: 3.5 MMOL/L (ref 3.5–5.3)
POTASSIUM SERPL-SCNC: 3.7 MMOL/L (ref 3.5–5.3)
PROT SERPL-MCNC: 6.2 G/DL (ref 6.4–8.2)
PROT SERPL-MCNC: 6.3 G/DL (ref 6.4–8.2)
PROTHROMBIN TIME: 11.9 SECONDS (ref 9.8–12.8)
RBC # BLD AUTO: 3.98 X10*6/UL (ref 4–5.2)
SODIUM SERPL-SCNC: 138 MMOL/L (ref 136–145)
SODIUM SERPL-SCNC: 138 MMOL/L (ref 136–145)
WBC # BLD AUTO: 8 X10*3/UL (ref 4.4–11.3)

## 2024-12-22 PROCEDURE — 80053 COMPREHEN METABOLIC PANEL: CPT | Performed by: INTERNAL MEDICINE

## 2024-12-22 PROCEDURE — 99239 HOSP IP/OBS DSCHRG MGMT >30: CPT | Performed by: STUDENT IN AN ORGANIZED HEALTH CARE EDUCATION/TRAINING PROGRAM

## 2024-12-22 PROCEDURE — 84100 ASSAY OF PHOSPHORUS: CPT | Performed by: STUDENT IN AN ORGANIZED HEALTH CARE EDUCATION/TRAINING PROGRAM

## 2024-12-22 PROCEDURE — 85027 COMPLETE CBC AUTOMATED: CPT | Performed by: INTERNAL MEDICINE

## 2024-12-22 PROCEDURE — 83735 ASSAY OF MAGNESIUM: CPT | Performed by: STUDENT IN AN ORGANIZED HEALTH CARE EDUCATION/TRAINING PROGRAM

## 2024-12-22 PROCEDURE — 85610 PROTHROMBIN TIME: CPT | Performed by: INTERNAL MEDICINE

## 2024-12-22 PROCEDURE — 2500000004 HC RX 250 GENERAL PHARMACY W/ HCPCS (ALT 636 FOR OP/ED)

## 2024-12-22 RX ADMIN — HEPARIN SODIUM 7500 UNITS: 5000 INJECTION, SOLUTION INTRAVENOUS; SUBCUTANEOUS at 06:43

## 2024-12-22 ASSESSMENT — COGNITIVE AND FUNCTIONAL STATUS - GENERAL
MOBILITY SCORE: 24
DAILY ACTIVITIY SCORE: 24

## 2024-12-22 ASSESSMENT — PAIN SCALES - GENERAL: PAINLEVEL_OUTOF10: 0 - NO PAIN

## 2024-12-22 NOTE — DISCHARGE INSTRUCTIONS
-Please avoid Tylenol  -You will need repeat labs in approximately 2 weeks  -Please follow-up with DAVINA Navarro in 4 weeks, please call to schedule  -Please follow-up with your primary care provider in 7 to 10 days, please call to schedule

## 2024-12-22 NOTE — NURSING NOTE
"0207 - poison control called again to receive update on the pt.   No change noted at this time.  Infusion is due to complete around 0245 per the MAR.   Dr. Rodriguez was spoken with regarding Poison controls recommendations check labs prior to completion.  New lab orders placed.     0215 - Labs drawn STAT.       0300 -  Acetadote infusion complete.   Midline flushed and capped.    Dr. Rodriguez notified.       0308 - Call placed to Poison Control at [phone].  Spoke with Lee, who stated that based on the current lab values, he recommends rechecking labs in approx. 6hr and hold the next Acetadote dose as she may not need it.  Pt. Is near the stated goal value of 388.5 , (which is 50% of the peak\").  Morning labs currently ordered for 0500.  May retime for 0800.    0619 - Call placed to phlebotomy regarding need to delay labs until 0800 per instructions by Poison Control to draw labs 6hrs after last lab.       0730 - AM labs drawn.    "

## 2024-12-22 NOTE — NURSING NOTE
0941: updated poison control representative Aram. Pt okay for discharge on their end after reporting liver function labs and INR    1047: pt discharged in stable condition with all belongings. Education provided. Pt escorted to car via wheelchair by this RN

## 2024-12-22 NOTE — DISCHARGE SUMMARY
Discharge Diagnosis  Acute hepatitis    Issues Requiring Follow-Up  Fatty liver   LFTs  Pancreas cystic lesion     Discharge Meds     Medication List      CONTINUE taking these medications     Victoza 2-Bart 0.6 mg/0.1 mL (18 mg/3 mL) injection; Generic drug:   liraglutide       Test Results Pending At Discharge  Pending Labs       No current pending labs.            Hospital Course   This is a 54-year-old female who presented with epigastric pain found to have transaminitis and concern for accidental Tylenol toxicity superimposed on NAFLD causing DILI.  GI was consulted and poison control was contacted. Pt was started on NAC protocol. MRCP showed severe hepatic steatosis.  No biliary tract stone or obstruction.  Subcentimeter cystic lesion of the pancreas, perhaps sidebranch IPMN.  Suggest surveillance MRCP in 1 year to document stability.  Her LFTs improved and she was doing well. She was determined to be stable for discharge home with instructions for follow up.     D/c >30min    Pertinent Physical Exam At Time of Discharge  Physical Exam  Constitutional: Well developed, no distress, alert and cooperative, obese  Skin: Warm and dry  Eyes: EOMI, clear sclera  ENMT: mucous membranes moist  Respiratory: Patent airways, CTAB  Cardiovascular: Regular, rate and rhythm  Abdominal: Nondistended, soft, non-tender, +BS  MSK: ROM intact  Neuro: alert and oriented x3    Outpatient Follow-Up  No future appointments.      Karissa Tang DO

## 2024-12-22 NOTE — NURSING NOTE
ADOD: 12/22.   Destination: home  Transportation Provided by:  spouse  Patient feels updated by provider(s) and involved in POC.   Patient has been advised to defer to AVS for new medications and follow-up visits.   Patient is inactive with MyChart.  [facility]  Appointments will be made by patient.  Patient has been notified about a survey and call back is to be expected 1-2 weeks post discharge.   Notified patient that DC Navigator is available everyday throughout their stay if any assistance is needed.     Patient had no questions for this nurse  Patients  on the way for transport

## 2025-01-01 NOTE — DOCUMENTATION CLARIFICATION NOTE
"    PATIENT:               LEONEL NICE  ACCT #:                  [id number]  MRN:                       [id number]  :                       1970  ADMIT DATE:       2024 5:14 PM  DISCH DATE:        2024 11:00 AM  RESPONDING PROVIDER #:        [id number]          PROVIDER RESPONSE TEXT:    Not taken as prescribed or unintentionally overdosed on the medication    CDI QUERY TEXT:    Clarification        Instruction:    Based on your assessment of the patient and the clinical information, please provide the requested documentation by clicking on the appropriate radio button and enter any additional information if prompted.    Question: Please further clarify the intent of the medication Tylenol taken    When answering this query, please exercise your independent professional judgment. The fact that a question is being asked, does not imply that any particular answer is desired or expected.    The patient's clinical indicators include:  Clinical Information:  54 year old female presented with central abdominal pain radiating  into the back and bilateral flanks.  Pt noted excessive Tylenol use -  with signs of elevated liver enzymes/liver injury.    Clinical Indicators:  24 ED Provider Note:  \"patient does endorse that she is been taking a lot of Tylenol lately, states she takes it for fibromyalgia and also because she came down with a cold a couple of weeks ago, states she is definitely taken between 4 and 8 tablets of Tylenol daily for the last 3 weeks and has definitely gone through more than 30 tabs during that timeframe...  discuss patient's case with poison control who did recommend starting NAC for Tylenol overdose.  She was then admitted to the internal medicine team for continued treatment of her liver injury. \"    24 H&P:  \"Admitted for acute hepatitis, with suspected DILI secondary to subacute APAP toxicity superimposed on history of NAFLD.\"    24 GI Consult:  \"Tylenol misuse " "concern for toxicity...  -Continue NAC in the treatment of suspected Tylenol toxicity  -Low suspicion for tylenol toxicity, noting APAP level less than 10...  Most likely had therapeutic misadventure/ DILI with acetaminophen. \"    12/22/24 D/C Summary:  \".. 54-year-old female who presented with epigastric pain found to have transaminitis and concern for accidental Tylenol toxicity superimposed on NAFLD causing DILI.  GI was consulted and poison control was contacted. Pt was started on NAC protocol. MRCP showed severe hepatic steatosis.  No biliary tract stone or obstruction.\"    Treatment:  - Poison Control  - GI consult  - Acetadote infusion, 12/16-12/17  - MRCP  - Avoid hepatotoxic drugs  - trend daily LFTs, bilirubin and INR    Risk Factors:  excessive Tylenol use, NAFLD  Options provided:  -- Taken as prescribed and signs and symptoms are an adverse effect of the medication  -- Not taken as prescribed or unintentionally overdosed on the medication  -- Other - I will add my own diagnosis  -- Refer to Clinical Documentation Reviewer    Query created by: Dai Shaikh on 12/27/2024 8:23 AM      Electronically signed by:  USHA OTTO DO 1/1/2025 8:28 AM          "